# Patient Record
Sex: FEMALE | Race: WHITE | NOT HISPANIC OR LATINO | Employment: OTHER | RURAL
[De-identification: names, ages, dates, MRNs, and addresses within clinical notes are randomized per-mention and may not be internally consistent; named-entity substitution may affect disease eponyms.]

---

## 2017-04-12 ENCOUNTER — OFFICE VISIT (OUTPATIENT)
Dept: CARDIOLOGY | Facility: CLINIC | Age: 52
End: 2017-04-12

## 2017-04-12 VITALS
SYSTOLIC BLOOD PRESSURE: 116 MMHG | WEIGHT: 204 LBS | HEIGHT: 65 IN | HEART RATE: 94 BPM | DIASTOLIC BLOOD PRESSURE: 64 MMHG | BODY MASS INDEX: 33.99 KG/M2

## 2017-04-12 DIAGNOSIS — Z79.4 TYPE 2 DIABETES MELLITUS WITHOUT COMPLICATION, WITH LONG-TERM CURRENT USE OF INSULIN (HCC): ICD-10-CM

## 2017-04-12 DIAGNOSIS — E11.9 TYPE 2 DIABETES MELLITUS WITHOUT COMPLICATION, WITH LONG-TERM CURRENT USE OF INSULIN (HCC): ICD-10-CM

## 2017-04-12 DIAGNOSIS — R07.89 OTHER CHEST PAIN: ICD-10-CM

## 2017-04-12 DIAGNOSIS — G47.39 OTHER SLEEP APNEA: ICD-10-CM

## 2017-04-12 DIAGNOSIS — E78.49 OTHER HYPERLIPIDEMIA: Primary | ICD-10-CM

## 2017-04-12 DIAGNOSIS — I10 ESSENTIAL HYPERTENSION: ICD-10-CM

## 2017-04-12 DIAGNOSIS — R42 VERTIGO: ICD-10-CM

## 2017-04-12 PROBLEM — E78.5 HYPERLIPEMIA: Status: ACTIVE | Noted: 2017-04-12

## 2017-04-12 PROBLEM — Z86.711 PERSONAL HISTORY OF PE (PULMONARY EMBOLISM): Status: ACTIVE | Noted: 2017-04-12

## 2017-04-12 PROBLEM — G47.30 SLEEP APNEA: Status: ACTIVE | Noted: 2017-04-12

## 2017-04-12 PROCEDURE — 99213 OFFICE O/P EST LOW 20 MIN: CPT | Performed by: NURSE PRACTITIONER

## 2017-04-12 RX ORDER — MONTELUKAST SODIUM 10 MG/1
10 TABLET ORAL NIGHTLY
COMMUNITY

## 2017-04-12 RX ORDER — CYCLOBENZAPRINE HCL 5 MG
5 TABLET ORAL 3 TIMES DAILY PRN
COMMUNITY
End: 2021-06-07

## 2017-04-12 RX ORDER — FENOFIBRATE 160 MG/1
160 TABLET ORAL DAILY
COMMUNITY
End: 2021-06-07

## 2017-04-12 RX ORDER — ISOSORBIDE MONONITRATE 30 MG/1
30 TABLET, EXTENDED RELEASE ORAL DAILY
COMMUNITY

## 2017-04-12 RX ORDER — LACTULOSE 10 G/15ML
20 SOLUTION ORAL DAILY
COMMUNITY

## 2017-04-12 RX ORDER — METOCLOPRAMIDE 10 MG/1
10 TABLET ORAL 3 TIMES DAILY
COMMUNITY
End: 2018-06-06 | Stop reason: ALTCHOICE

## 2017-04-12 RX ORDER — MECLIZINE HYDROCHLORIDE 25 MG/1
25 TABLET ORAL EVERY 6 HOURS PRN
COMMUNITY

## 2017-04-12 RX ORDER — TEMAZEPAM 30 MG/1
30 CAPSULE ORAL DAILY
COMMUNITY

## 2017-04-12 RX ORDER — ALLOPURINOL 300 MG/1
300 TABLET ORAL DAILY
COMMUNITY

## 2017-04-12 RX ORDER — INSULIN GLARGINE 100 [IU]/ML
INJECTION, SOLUTION SUBCUTANEOUS DAILY
COMMUNITY
End: 2018-06-06 | Stop reason: ALTCHOICE

## 2017-04-12 RX ORDER — SUCRALFATE 1 G/1
1 TABLET ORAL 4 TIMES DAILY
COMMUNITY
End: 2018-06-06 | Stop reason: ALTCHOICE

## 2017-04-12 RX ORDER — METOPROLOL SUCCINATE 50 MG/1
50 TABLET, EXTENDED RELEASE ORAL DAILY
COMMUNITY
End: 2018-12-12 | Stop reason: DRUGHIGH

## 2017-04-12 RX ORDER — DULOXETIN HYDROCHLORIDE 60 MG/1
60 CAPSULE, DELAYED RELEASE ORAL DAILY
COMMUNITY

## 2017-04-12 RX ORDER — ATORVASTATIN CALCIUM 20 MG/1
20 TABLET, FILM COATED ORAL DAILY
COMMUNITY
End: 2021-06-07

## 2017-04-12 NOTE — PROGRESS NOTES
Chief Complaint   Patient presents with   • Follow-up     Having occasional hard sharp pain to left upper chest. PCP writes refills on medication. Last labs a month ago per PCP.   • Dizziness     She relates to vertigo.   • Shortness of Breath     About the same as before, wear CPAP at night and taking neb tx.       Cardiac Complaints  dyspnea and Dizziness      Subjective   Hedy Kelly is a 51 y.o. female with a history of DM, HTN,and past history of PE.  She was seen at [facility] in August of last year for chest pain with diaphoresis, troponin at that time was elevated.  She was then sent to [facility] where she had an echo that showed normal EF and only mild MR.  Cardiac cath was also done that showed essentially normal coronaries and normal LV function.  She returns today for follow up and states a rare chest pain in the left upper chest, it has been the same for sometime.  She does continue to have some shortness of breath but she relates to lung history and COPD.She continues eliquis therapy for history of PE.  Labs and refills she reports with PCP.  She does report some dizziness which she relates to allergies and vertigo.        Cardiac History  Past Surgical History:   Procedure Laterality Date   • CARDIAC CATHETERIZATION  08/24/2016    Normal Coronaries   • CARDIAC CATHETERIZATION  2004    at [place] after tPa. No stents.   • CARDIAC CATHETERIZATION  08/24/2016    EF 60%, normal coronaries   • CARDIOVASCULAR STRESS TEST  2014    ([facility] CVA)   • ECHO - CONVERTED  08/24/2016    @[facility]. EF 60%   • ECHO - CONVERTED  2014    ([facility])    • ECHO - CONVERTED  08/24/2016    EF 60%, mild MR       Current Outpatient Prescriptions   Medication Sig Dispense Refill   • allopurinol (ZYLOPRIM) 300 MG tablet Take 300 mg by mouth Daily.     • apixaban (ELIQUIS) 5 MG tablet tablet Take 5 mg by mouth 2 (Two) Times a Day.     • atorvastatin (LIPITOR) 20 MG tablet Take 20 mg by mouth Daily.     • cyclobenzaprine (FLEXERIL) 5 MG tablet  Take 5 mg by mouth 3 (Three) Times a Day As Needed for Muscle Spasms.     • DULoxetine (CYMBALTA) 60 MG capsule Take 60 mg by mouth Daily.     • esomeprazole (nexIUM) 20 MG capsule Take 20 mg by mouth 2 (Two) Times a Day.     • fenofibrate 160 MG tablet Take 160 mg by mouth Daily.     • insulin glargine (LANTUS) 100 UNIT/ML injection Inject  under the skin Daily.     • isosorbide mononitrate (IMDUR) 30 MG 24 hr tablet Take 30 mg by mouth Daily.     • lactulose (CHRONULAC) 10 GM/15ML solution Take 20 g by mouth Daily.     • meclizine (ANTIVERT) 25 MG tablet Take 25 mg by mouth Every 6 (Six) Hours As Needed for dizziness.     • metFORMIN (GLUCOPHAGE) 1000 MG tablet Take 1,000 mg by mouth 2 (Two) Times a Day With Meals.     • metoclopramide (REGLAN) 10 MG tablet Take 10 mg by mouth 3 (Three) Times a Day.     • metoprolol succinate XL (TOPROL-XL) 50 MG 24 hr tablet Take 50 mg by mouth Daily.     • montelukast (SINGULAIR) 10 MG tablet Take 10 mg by mouth Every Night.     • Psyllium (METAMUCIL FIBER PO) Take  by mouth Daily.     • SITagliptin (JANUVIA) 100 MG tablet Take 100 mg by mouth Daily.     • sucralfate (CARAFATE) 1 G tablet Take 1 g by mouth 4 (Four) Times a Day.     • temazepam (RESTORIL) 30 MG capsule Take 30 mg by mouth Daily.       No current facility-administered medications for this visit.        Penicillins and Sulfa antibiotics    Past Medical History:   Diagnosis Date   • H/O cervical spine surgery    • History of cholecystectomy    • History of endometrial ablation    • Hypercholesterolemia    • Hyperlipidemia    • Hypertension    • Metabolic syndrome    • Pulmonary emboli        Social History     Social History   • Marital status:      Spouse name: N/A   • Number of children: N/A   • Years of education: N/A     Occupational History   • Not on file.     Social History Main Topics   • Smoking status: Former Smoker     Quit date: 2004   • Smokeless tobacco: Never Used   • Alcohol use No   • Drug  "use: No   • Sexual activity: Not on file     Other Topics Concern   • Not on file     Social History Narrative       Family History   Problem Relation Age of Onset   • Hypertension Mother    • Cancer Father    • Heart attack Maternal Uncle    • Hyperlipidemia Son        Review of Systems   Constitutional: Negative.    HENT: Negative.    Eyes: Negative.    Respiratory: Positive for shortness of breath.    Cardiovascular: Negative.    Gastrointestinal: Negative.    Endocrine: Negative.    Musculoskeletal: Negative.    Neurological: Positive for dizziness.   Psychiatric/Behavioral: Negative.        DiabetesYes  Thyroidnormal    Objective     /64  Pulse 94  Ht 65\" (165.1 cm)  Wt 204 lb (92.5 kg)  BMI 33.95 kg/m2    Physical Exam   Constitutional: She is oriented to person, place, and time. She appears well-developed and well-nourished.   HENT:   Head: Normocephalic and atraumatic.   Eyes: EOM are normal. Pupils are equal, round, and reactive to light.   Neck: Normal range of motion. Neck supple.   Cardiovascular: Normal rate and regular rhythm.    Murmur heard.  Pulmonary/Chest: Effort normal and breath sounds normal.   Abdominal: Soft.   Musculoskeletal: Normal range of motion.   Neurological: She is alert and oriented to person, place, and time.   Skin: Skin is warm and dry.   Psychiatric: She has a normal mood and affect.       Procedures    Assessment/Plan     HR and BP are stable.  No changes to meds will be made.  Since no new cardiac concerns are present, we will continue with current management.  Most recent cath findings from August of 2016 showed normal coronaries and good LV function. No new cardiac testing advised today as cardiac status appears stable. Labs and refills with you, could we get next copy?  No refills are needed.  Good cardiac diet and activity as tolerated advised.  6 month follow up advised or sooner if needed.      Problems Addressed this Visit        Cardiovascular and Mediastinum "    HTN (hypertension)    Relevant Medications    metoprolol succinate XL (TOPROL-XL) 50 MG 24 hr tablet    Hyperlipemia - Primary    Relevant Medications    atorvastatin (LIPITOR) 20 MG tablet    fenofibrate 160 MG tablet       Endocrine    Type 2 diabetes mellitus without complication, without long-term current use of insulin    Relevant Medications    insulin glargine (LANTUS) 100 UNIT/ML injection    SITagliptin (JANUVIA) 100 MG tablet    metFORMIN (GLUCOPHAGE) 1000 MG tablet       Other    Sleep apnea    Vertigo      Other Visit Diagnoses     Other chest pain                  Electronically signed by AYLIN Martínez April 12, 2017 4:24 PM

## 2017-06-27 RX ORDER — DICYCLOMINE HCL 20 MG
TABLET ORAL
Qty: 90 TABLET | OUTPATIENT
Start: 2017-06-27

## 2017-11-21 ENCOUNTER — TELEPHONE (OUTPATIENT)
Dept: CARDIOLOGY | Facility: CLINIC | Age: 52
End: 2017-11-21

## 2017-11-21 NOTE — TELEPHONE ENCOUNTER
Received fax from [facility] for patient to have a colonoscopy and EGD on 12/18/17. Patient is on Eliquis.       Fax [phone]

## 2017-12-06 ENCOUNTER — OFFICE VISIT (OUTPATIENT)
Dept: CARDIOLOGY | Facility: CLINIC | Age: 52
End: 2017-12-06

## 2017-12-06 VITALS
WEIGHT: 209 LBS | DIASTOLIC BLOOD PRESSURE: 72 MMHG | HEIGHT: 65 IN | BODY MASS INDEX: 34.82 KG/M2 | HEART RATE: 88 BPM | SYSTOLIC BLOOD PRESSURE: 110 MMHG

## 2017-12-06 DIAGNOSIS — R07.89 OTHER CHEST PAIN: ICD-10-CM

## 2017-12-06 DIAGNOSIS — E11.9 TYPE 2 DIABETES MELLITUS WITHOUT COMPLICATION, WITH LONG-TERM CURRENT USE OF INSULIN (HCC): ICD-10-CM

## 2017-12-06 DIAGNOSIS — R00.2 PALPITATIONS: ICD-10-CM

## 2017-12-06 DIAGNOSIS — I10 ESSENTIAL HYPERTENSION: Primary | ICD-10-CM

## 2017-12-06 DIAGNOSIS — Z79.4 TYPE 2 DIABETES MELLITUS WITHOUT COMPLICATION, WITH LONG-TERM CURRENT USE OF INSULIN (HCC): ICD-10-CM

## 2017-12-06 DIAGNOSIS — G47.39 OTHER SLEEP APNEA: ICD-10-CM

## 2017-12-06 DIAGNOSIS — Z86.711 PERSONAL HISTORY OF PE (PULMONARY EMBOLISM): ICD-10-CM

## 2017-12-06 DIAGNOSIS — E78.49 OTHER HYPERLIPIDEMIA: ICD-10-CM

## 2017-12-06 DIAGNOSIS — R06.02 SHORTNESS OF BREATH: ICD-10-CM

## 2017-12-06 PROCEDURE — 99213 OFFICE O/P EST LOW 20 MIN: CPT | Performed by: NURSE PRACTITIONER

## 2017-12-06 NOTE — PROGRESS NOTES
Chief Complaint   Patient presents with   • Follow-up     For cardiac management. Reports that she does have chest pains that are sharp, says she did go to [facility] at one point. Says she was told that she had a small heart attack at [facility], but they weren't sure. Reports that she does get short of breath and can be sitting down when it happens. Reports that she does have palpitations occasionally, but that they normally happen when she wakes up if she does have them. Reports lab work done recently with PCP, not in chart. Reports that is showed anemia.   • Med Refill     Did not brign medication list. Instructed to call office with medication list.        Cardiac Complaints  chest pressure/discomfort, dyspnea and palpitations      Subjective   Hedy Kelly is a 52 y.o. female with  DM, HTN, COPD, and past history of PE.  She was seen at [facility] in August of last year for chest pain associated with diaphoresis, troponin at that time was elevated.  She was then sent to [facility] where she had an echo that showed normal EF and only mild MR. Cardiac cath was also done that showed essentially normal coronaries and normal LV function.  She continues eliquis therapy for history of PE.  She continues to have some rare chest pain that is sharp in nature.  She went to [facility] for the same, and they evaluated it and ruled out cardiac concerns.  She does continue to have shortness of breath that comes both with exertion and at rest, she relates to her COPD and continues to follow with Dr. Ramirez in regards.  She does admit to recent anemia on labs and states she had guiiac stool sample but unsure of results, she states she will have an EGD and colonoscopy on the 18th of the month with Dr. Bradford.  No refills are currently needed as she reports with PCP.         Cardiac History  Past Surgical History:   Procedure Laterality Date   • CARDIAC CATHETERIZATION  08/24/2016    Normal Coronaries   • CARDIAC CATHETERIZATION  2004    at [place] after tPa.  No stents.   • CARDIAC CATHETERIZATION  08/24/2016    EF 60%, normal coronaries   • CARDIOVASCULAR STRESS TEST  2014    ([facility] CVA)   • ECHO - CONVERTED  08/24/2016    @[facility]. EF 60%   • ECHO - CONVERTED  2014    ([facility]A)    • ECHO - CONVERTED  08/24/2016    EF 60%, mild MR       Current Outpatient Prescriptions   Medication Sig Dispense Refill   • allopurinol (ZYLOPRIM) 300 MG tablet Take 300 mg by mouth Daily.     • apixaban (ELIQUIS) 5 MG tablet tablet Take 1 tablet by mouth 2 (Two) Times a Day. 60 tablet 11   • atorvastatin (LIPITOR) 20 MG tablet Take 20 mg by mouth Daily.     • cyclobenzaprine (FLEXERIL) 5 MG tablet Take 5 mg by mouth 3 (Three) Times a Day As Needed for Muscle Spasms.     • DULoxetine (CYMBALTA) 60 MG capsule Take 60 mg by mouth Daily.     • esomeprazole (nexIUM) 20 MG capsule Take 20 mg by mouth 2 (Two) Times a Day.     • fenofibrate 160 MG tablet Take 160 mg by mouth Daily.     • insulin glargine (LANTUS) 100 UNIT/ML injection Inject  under the skin Daily.     • isosorbide mononitrate (IMDUR) 30 MG 24 hr tablet Take 30 mg by mouth Daily.     • lactulose (CHRONULAC) 10 GM/15ML solution Take 20 g by mouth Daily.     • meclizine (ANTIVERT) 25 MG tablet Take 25 mg by mouth Every 6 (Six) Hours As Needed for dizziness.     • metFORMIN (GLUCOPHAGE) 1000 MG tablet Take 1,000 mg by mouth 2 (Two) Times a Day With Meals.     • metoclopramide (REGLAN) 10 MG tablet Take 10 mg by mouth 3 (Three) Times a Day.     • metoprolol succinate XL (TOPROL-XL) 50 MG 24 hr tablet Take 50 mg by mouth Daily.     • montelukast (SINGULAIR) 10 MG tablet Take 10 mg by mouth Every Night.     • Psyllium (METAMUCIL FIBER PO) Take  by mouth Daily.     • SITagliptin (JANUVIA) 100 MG tablet Take 100 mg by mouth Daily.     • sucralfate (CARAFATE) 1 G tablet Take 1 g by mouth 4 (Four) Times a Day.     • temazepam (RESTORIL) 30 MG capsule Take 30 mg by mouth Daily.       No current facility-administered medications for this visit.   "      Oxycodone; Penicillins; and Sulfa antibiotics    Past Medical History:   Diagnosis Date   • H/O cervical spine surgery    • History of cholecystectomy    • History of endometrial ablation    • Hypercholesterolemia    • Hyperlipidemia    • Hypertension    • Metabolic syndrome    • Pulmonary emboli        Social History     Social History   • Marital status:      Spouse name: N/A   • Number of children: N/A   • Years of education: N/A     Occupational History   • Not on file.     Social History Main Topics   • Smoking status: Former Smoker     Quit date: 2004   • Smokeless tobacco: Never Used   • Alcohol use No   • Drug use: No   • Sexual activity: Not on file     Other Topics Concern   • Not on file     Social History Narrative       Family History   Problem Relation Age of Onset   • Hypertension Mother    • Cancer Father    • Heart attack Maternal Uncle    • Hyperlipidemia Son        Review of Systems   Constitution: Negative for weakness and malaise/fatigue.   Cardiovascular: Positive for chest pain and palpitations. Negative for dyspnea on exertion, near-syncope and syncope.   Respiratory: Positive for shortness of breath. Negative for wheezing.    Musculoskeletal: Negative for arthritis and back pain.   Gastrointestinal: Negative for anorexia, heartburn and nausea.   Genitourinary: Negative for dysuria, hesitancy and nocturia.   Neurological: Negative for dizziness, light-headedness, loss of balance and numbness.   Psychiatric/Behavioral: Negative for altered mental status and depression.       DiabetesYes  Thyroidnormal    Objective     /72 (BP Location: Right arm)  Pulse 88  Ht 165.1 cm (65\")  Wt 94.8 kg (209 lb)  BMI 34.78 kg/m2    Physical Exam   Constitutional: She is oriented to person, place, and time. She appears well-developed and well-nourished.   HENT:   Head: Normocephalic and atraumatic.   Eyes: EOM are normal. Pupils are equal, round, and reactive to light.   Neck: Normal " range of motion. Neck supple.   Cardiovascular: Normal rate and regular rhythm.    Murmur heard.  Pulmonary/Chest: Effort normal and breath sounds normal.   Abdominal: Soft.   Musculoskeletal: Normal range of motion.   Neurological: She is alert and oriented to person, place, and time.   Skin: Skin is warm and dry.   Psychiatric: She has a normal mood and affect. Her behavior is normal.       Procedures    Assessment/Plan     HR and BP are both stable today.  She does continue to have some rare sharp chest pains but this is no worse than prior, for now, we will continue on current regimen. No new cardiac workup will be advised. We talked about discontinuing her mobic therapy since she is on eliquis also.  Unsure if most recent labs, but she states she was anemic on them, she says she has remained on current. She follows with you in regards.  No current medication list available at present, patient reports she will get list to the office. She will follow up with GI later this month.  No refills are needed. She continues to follow with Dr. Ramirez in regards to pulmonary management. Good cardiac diet and activity as tolerated advised.  If cardiac symptoms should worsen, patient advised to call with concerns for further recommendations.  6 month follow up advised or sooner if needed.  At next visit, we will consider repeat cardiac workup since length of time since last testing and risk of silent ischemic burden.        Problems Addressed this Visit        Cardiovascular and Mediastinum    HTN (hypertension) - Primary    Hyperlipemia       Other    Sleep apnea    Personal history of PE (pulmonary embolism)      Other Visit Diagnoses     Other chest pain        Palpitations        Shortness of breath        Type 2 diabetes mellitus without complication, with long-term current use of insulin                      Electronically signed by AYLIN Martínez December 6, 2017 5:06 PM

## 2018-06-01 NOTE — PROGRESS NOTES
Chief Complaint   Patient presents with   • Follow-up     For cardiac management. Patient is on aspirin. Reports she has had chest pains that are sharp. Reports that she has had an increased HR occaisionally. Reports she does have shortness of breath all the time. Reports she does have palpitations. Last lab work was done about a month ago per PCP, not in chart.    • Med Refill     Needs refills on eliquis. 30 day supplys to [facility]. Also needs script for nitro.       Cardiac Complaints  palpitations      Subjective   Hedy Kelly is a 53 y.o. female with  DM, HTN, COPD, and past history of PE.  She was seen at [facility] in August of last year for chest pain associated with diaphoresis, troponin at that time was elevated.  She was then sent to [facility] where she had an echo that showed normal EF and only mild MR. Cardiac cath was also done that showed essentially normal coronaries and normal LV function.  She continues eliquis therapy for history of PE. She returns today for follow up and reports worsening palpitations.   She states that she feels like her heart is often racing and feels like it is beating too hard and too quick, she says it comes with and without exertion. She continues to have some rare chest pain that is sharp in nature. She does continue to have shortness of breath that comes both with exertion and at rest, she relates to her COPD and continues to follow with Dr. Ramirez in regards.  Labs are done with PCP, no copy is available for review.  Refills of eliquis and NTG requested for 30 day supply.      Cardiac History  Past Surgical History:   Procedure Laterality Date   • CARDIAC CATHETERIZATION  08/24/2016    Normal Coronaries   • CARDIAC CATHETERIZATION  2004    at [place] after tPa. No stents.   • CARDIAC CATHETERIZATION  08/24/2016    EF 60%, normal coronaries   • CARDIOVASCULAR STRESS TEST  2014    ([facility] CVA)   • ECHO - CONVERTED  08/24/2016    @[facility]. EF 60%   • ECHO - CONVERTED  2014     ([facility] CVA)    • ECHO - CONVERTED  08/24/2016    EF 60%, mild MR       Current Outpatient Prescriptions   Medication Sig Dispense Refill   • albuterol (PROVENTIL HFA;VENTOLIN HFA) 108 (90 Base) MCG/ACT inhaler Inhale 2 puffs Every 4 (Four) Hours As Needed for Wheezing.     • allopurinol (ZYLOPRIM) 300 MG tablet Take 300 mg by mouth Daily.     • apixaban (ELIQUIS) 5 MG tablet tablet Take 1 tablet by mouth Every 12 (Twelve) Hours. 60 tablet 11   • aspirin 81 MG EC tablet Take 81 mg by mouth Daily.     • atorvastatin (LIPITOR) 20 MG tablet Take 20 mg by mouth Daily.     • budesonide-formoterol (SYMBICORT) 160-4.5 MCG/ACT inhaler Inhale 2 puffs 2 (Two) Times a Day.     • cyclobenzaprine (FLEXERIL) 5 MG tablet Take 5 mg by mouth 3 (Three) Times a Day As Needed for Muscle Spasms.     • DULoxetine (CYMBALTA) 60 MG capsule Take 60 mg by mouth Daily.     • esomeprazole (nexIUM) 20 MG capsule Take 20 mg by mouth 2 (Two) Times a Day.     • Fe-Succ Ac-C-Thre Ac-B12-FA (FERREX 150 FORTE PLUS)  MG capsule capsule Take  by mouth Daily With Breakfast.     • fenofibrate 160 MG tablet Take 160 mg by mouth Daily.     • folic acid (FOLVITE) 400 MCG tablet Take 400 mcg by mouth Daily.     • furosemide (LASIX) 40 MG tablet Take 40 mg by mouth 2 (Two) Times a Day.     • Insulin Glargine (BASAGLAR KWIKPEN) 100 UNIT/ML injection pen Inject  under the skin.     • isosorbide mononitrate (IMDUR) 30 MG 24 hr tablet Take 30 mg by mouth Daily.     • lactulose (CHRONULAC) 10 GM/15ML solution Take 20 g by mouth Daily.     • levocetirizine (XYZAL) 5 MG tablet Take 5 mg by mouth Every Evening.     • meclizine (ANTIVERT) 25 MG tablet Take 25 mg by mouth Every 6 (Six) Hours As Needed for dizziness.     • meloxicam (MOBIC) 15 MG tablet Take 15 mg by mouth Daily.     • metFORMIN (GLUCOPHAGE) 1000 MG tablet Take 1,000 mg by mouth 2 (Two) Times a Day With Meals.     • metoprolol succinate XL (TOPROL-XL) 50 MG 24 hr tablet Take 50 mg by mouth Daily.      • montelukast (SINGULAIR) 10 MG tablet Take 10 mg by mouth Every Night.     • potassium chloride (K-DUR,KLOR-CON) 20 MEQ CR tablet Take 20 mEq by mouth Daily.     • Psyllium (METAMUCIL FIBER PO) Take  by mouth Daily.     • SITagliptin (JANUVIA) 100 MG tablet Take 100 mg by mouth Daily.     • temazepam (RESTORIL) 30 MG capsule Take 30 mg by mouth Daily.     • Tiotropium Bromide Monohydrate (SPIRIVA RESPIMAT IN) Inhale.     • vitamin B-12 (CYANOCOBALAMIN) 500 MCG tablet Take 500 mcg by mouth 2 (Two) Times a Day.     • vitamin C (ASCORBIC ACID) 500 MG tablet Take 500 mg by mouth Daily.     • nitroglycerin (NITROSTAT) 0.4 MG SL tablet 1 under the tongue as needed for angina, may repeat q5mins for up three doses 25 tablet 0     No current facility-administered medications for this visit.        Oxycodone; Penicillins; and Sulfa antibiotics    Past Medical History:   Diagnosis Date   • H/O cervical spine surgery    • History of cholecystectomy    • History of endometrial ablation    • Hypercholesterolemia    • Hyperlipidemia    • Hypertension    • Metabolic syndrome    • Pulmonary emboli        Social History     Social History   • Marital status:      Spouse name: N/A   • Number of children: N/A   • Years of education: N/A     Occupational History   • Not on file.     Social History Main Topics   • Smoking status: Former Smoker     Quit date: 2004   • Smokeless tobacco: Never Used   • Alcohol use No   • Drug use: No   • Sexual activity: Not on file     Other Topics Concern   • Not on file     Social History Narrative   • No narrative on file       Family History   Problem Relation Age of Onset   • Hypertension Mother    • Cancer Father    • Heart attack Maternal Uncle    • Hyperlipidemia Son        Review of Systems   Constitution: Negative for weakness and malaise/fatigue.   Cardiovascular: Positive for chest pain, dyspnea on exertion and palpitations. Negative for irregular heartbeat, near-syncope, paroxysmal  "nocturnal dyspnea and syncope.   Respiratory: Positive for shortness of breath. Negative for cough and wheezing.    Musculoskeletal: Negative for back pain, joint pain and joint swelling.   Gastrointestinal: Negative for anorexia, heartburn and nausea.   Genitourinary: Negative for dysuria, hematuria, hesitancy and nocturia.   Neurological: Negative for dizziness, light-headedness and loss of balance.   Psychiatric/Behavioral: Negative for depression and memory loss. The patient is not nervous/anxious.            Objective     /80 (BP Location: Right arm)   Pulse 84   Ht 165.1 cm (65\")   Wt 89.4 kg (197 lb)   BMI 32.78 kg/m²     Physical Exam   Constitutional: She is oriented to person, place, and time. She appears well-developed and well-nourished.   HENT:   Head: Normocephalic and atraumatic.   Eyes: EOM are normal. Pupils are equal, round, and reactive to light.   Neck: Normal range of motion. Neck supple.   Cardiovascular: Normal rate and regular rhythm.    Murmur heard.  Pulmonary/Chest: Effort normal and breath sounds normal.   Abdominal: Soft.   Musculoskeletal: Normal range of motion.   Neurological: She is alert and oriented to person, place, and time.   Skin: Skin is warm and dry.   Psychiatric: She has a normal mood and affect. Her behavior is normal.       Procedures    Assessment/Plan     HR and BP are stable today.  HTN well managed on current, no adjustment advised.  For hyperlipidemia management, she continues on lipitor therapy without concerns and tolerates well. FLP she reports with your office.  She does report more palpitations recently and feels like her heart is often racing, we will advise to wear a 5 day holter monitor to assess for any tachyarrhythmias.  More recommendations to follow.  Limited caffeine intake encouraged.  For DM she continues on glucophage, januvia, and insulin therapy and tolerates without concerns, AIC monitored with your office.  For COPD management, patient " continues to follow with Dr. Ramirez in regards and continues with CPAP therapy at night for sleep apnea but denies use of oxygen bled in.  Refills of eliquis therapy sent per request as bleeding is denied. BMI elevated at 32.78, good cardiac ADA diet with limited caloric intake, saturated fat, and carbohydrates encouraged.  6 month follow up advised or sooner if needed.       Problems Addressed this Visit        Cardiovascular and Mediastinum    HTN (hypertension)    Relevant Medications    furosemide (LASIX) 40 MG tablet    Hyperlipemia       Endocrine    Type 2 diabetes mellitus without complication, without long-term current use of insulin    Relevant Medications    Insulin Glargine (BASAGLAR KWIKPEN) 100 UNIT/ML injection pen       Other    Personal history of PE (pulmonary embolism)      Other Visit Diagnoses     Palpitations    -  Primary    Relevant Orders    Holter Monitor - 72 Hour Up To 21 Days    Tachycardia        Relevant Orders    Holter Monitor - 72 Hour Up To 21 Days    Obstructive sleep apnea syndrome        COPD mixed type        Relevant Medications    albuterol (PROVENTIL HFA;VENTOLIN HFA) 108 (90 Base) MCG/ACT inhaler    budesonide-formoterol (SYMBICORT) 160-4.5 MCG/ACT inhaler    Tiotropium Bromide Monohydrate (SPIRIVA RESPIMAT IN)    levocetirizine (XYZAL) 5 MG tablet    Obesity (BMI 30.0-34.9)              Patient's Body mass index is 32.78 kg/m². BMI is above normal parameters. Recommendations include: nutrition counseling.          Electronically signed by AYLIN Martínez June 6, 2018 3:44 PM

## 2018-06-06 ENCOUNTER — TELEPHONE (OUTPATIENT)
Dept: CARDIOLOGY | Facility: CLINIC | Age: 53
End: 2018-06-06

## 2018-06-06 ENCOUNTER — OFFICE VISIT (OUTPATIENT)
Dept: CARDIOLOGY | Facility: CLINIC | Age: 53
End: 2018-06-06

## 2018-06-06 VITALS
HEIGHT: 65 IN | HEART RATE: 84 BPM | BODY MASS INDEX: 32.82 KG/M2 | WEIGHT: 197 LBS | SYSTOLIC BLOOD PRESSURE: 120 MMHG | DIASTOLIC BLOOD PRESSURE: 80 MMHG

## 2018-06-06 DIAGNOSIS — G47.33 OBSTRUCTIVE SLEEP APNEA SYNDROME: ICD-10-CM

## 2018-06-06 DIAGNOSIS — I10 ESSENTIAL HYPERTENSION: ICD-10-CM

## 2018-06-06 DIAGNOSIS — J44.9 COPD MIXED TYPE (HCC): ICD-10-CM

## 2018-06-06 DIAGNOSIS — Z86.711 PERSONAL HISTORY OF PE (PULMONARY EMBOLISM): ICD-10-CM

## 2018-06-06 DIAGNOSIS — E11.9 TYPE 2 DIABETES MELLITUS WITHOUT COMPLICATION, WITHOUT LONG-TERM CURRENT USE OF INSULIN (HCC): ICD-10-CM

## 2018-06-06 DIAGNOSIS — R00.0 TACHYCARDIA: ICD-10-CM

## 2018-06-06 DIAGNOSIS — E78.00 PURE HYPERCHOLESTEROLEMIA: ICD-10-CM

## 2018-06-06 DIAGNOSIS — R00.2 PALPITATIONS: Primary | ICD-10-CM

## 2018-06-06 DIAGNOSIS — E66.9 OBESITY (BMI 30.0-34.9): ICD-10-CM

## 2018-06-06 PROCEDURE — 99214 OFFICE O/P EST MOD 30 MIN: CPT | Performed by: NURSE PRACTITIONER

## 2018-06-06 RX ORDER — IRON ASPGLY,PS/C/B12/FA/CA/SUC 150-25-1
CAPSULE ORAL
COMMUNITY
End: 2021-06-07

## 2018-06-06 RX ORDER — BUDESONIDE AND FORMOTEROL FUMARATE DIHYDRATE 160; 4.5 UG/1; UG/1
2 AEROSOL RESPIRATORY (INHALATION)
COMMUNITY
End: 2021-06-07

## 2018-06-06 RX ORDER — ALBUTEROL SULFATE 90 UG/1
2 AEROSOL, METERED RESPIRATORY (INHALATION) EVERY 4 HOURS PRN
COMMUNITY

## 2018-06-06 RX ORDER — LEVOCETIRIZINE DIHYDROCHLORIDE 5 MG/1
5 TABLET, FILM COATED ORAL EVERY EVENING
COMMUNITY

## 2018-06-06 RX ORDER — ASPIRIN 81 MG/1
81 TABLET ORAL DAILY
COMMUNITY
End: 2021-06-07

## 2018-06-06 RX ORDER — NITROGLYCERIN 0.4 MG/1
TABLET SUBLINGUAL
Qty: 25 TABLET | Refills: 0 | Status: SHIPPED | OUTPATIENT
Start: 2018-06-06 | End: 2019-11-27 | Stop reason: SDUPTHER

## 2018-06-06 RX ORDER — POTASSIUM CHLORIDE 20 MEQ/1
20 TABLET, EXTENDED RELEASE ORAL DAILY
COMMUNITY

## 2018-06-06 RX ORDER — INSULIN GLARGINE 100 [IU]/ML
INJECTION, SOLUTION SUBCUTANEOUS
COMMUNITY

## 2018-06-06 RX ORDER — FUROSEMIDE 40 MG/1
40 TABLET ORAL DAILY
COMMUNITY

## 2018-06-06 RX ORDER — MELOXICAM 15 MG/1
15 TABLET ORAL DAILY
COMMUNITY
End: 2019-11-27 | Stop reason: ALTCHOICE

## 2018-06-06 RX ORDER — ASCORBIC ACID 500 MG
500 TABLET ORAL DAILY
COMMUNITY
End: 2021-06-07

## 2018-06-06 RX ORDER — CHOLECALCIFEROL (VITAMIN D3) 125 MCG
500 CAPSULE ORAL 2 TIMES DAILY
COMMUNITY
End: 2021-06-07

## 2018-06-06 RX ORDER — LANOLIN ALCOHOL/MO/W.PET/CERES
400 CREAM (GRAM) TOPICAL DAILY
COMMUNITY
End: 2021-06-07

## 2018-06-12 ENCOUNTER — TELEPHONE (OUTPATIENT)
Dept: CARDIOLOGY | Facility: CLINIC | Age: 53
End: 2018-06-12

## 2018-06-12 NOTE — TELEPHONE ENCOUNTER
As staff will not be at [facility] this Wednesday, patient was called to ask if she could come to [facility] for e-patch placement. She asked if she could come to [facility] next Wednesday instead.

## 2018-07-02 ENCOUNTER — OUTSIDE FACILITY SERVICE (OUTPATIENT)
Dept: CARDIOLOGY | Facility: CLINIC | Age: 53
End: 2018-07-02

## 2018-07-02 PROCEDURE — 0298T PR EXT ECG > 48HR TO 21 DAY REVIEW AND INTERPRETATN: CPT | Performed by: INTERNAL MEDICINE

## 2018-12-07 NOTE — PROGRESS NOTES
Chief Complaint   Patient presents with   • Follow-up     For cardiac management. Patient is on aspirin. Reports that she has been having some sharp chest pains. Reports that she has had shortness of breath. Reports she has had a couple of episodes where it felt like the pain was so bad she couldn't. Reports that she has had some palpitations. Dr. Cardoza does lab work every 3 months, not in chart.    • Med Refill     Did not bring medication list.        Cardiac Complaints  chest pressure/discomfort and dyspnea, palpitations      Subjective   Hedy Kelly is a 53 y.o. female with  DM, HTN, COPD, and past history of PE.  She was seen at [facility] in August of 2016 for chest pain associated with diaphoresis. Troponin at that time was elevated.  She was then sent to [facility] where she had an echo that showed normal EF and only mild MR. Cardiac cath was also done that showed essentially normal coronaries and normal LV function.  She continues eliquis therapy for history of PE.  At last visit, palpitations were reported, holter was advised which showed baseline NSR, no arrhythmia.  Current was continued.  She returns today for follow up and reports several episodes over the last several months of severe chest pain. She says the pain will come at once and is sharp in nature in the center of her chest without radiation and is about 10/10.  Goes away after a few minutes, she did take NTG x1 with the first spell, but says other 2 went away on her own.  She does also have some shortness of breath and palpitations but admits to being sick over the last several weeks and has been battling bronchitis which she has discussed with PCP.  She does report seeing Dr. Cardoza for anemia and states she was told she had a fast heart rate at that time.  Labs done with PCP and every 3 months with Dr. Cardoza.  No refills needed, no list available for review.  Patient reports blood sugars have been elevated but not over 200.  She continues to follow  with Dr. Ramirez for pulmonary management.      Cardiac History  Past Surgical History:   Procedure Laterality Date   • CARDIAC CATHETERIZATION  08/24/2016    Normal Coronaries   • CARDIAC CATHETERIZATION  2004    at [place] after tPa. No stents.   • CARDIAC CATHETERIZATION  08/24/2016    EF 60%, normal coronaries   • CARDIOVASCULAR STRESS TEST  2014    ([facility] CVA)   • CONVERTED (HISTORICAL) HOLTER  07/02/2018    Baseline NSR:  Avg HR 92, no arrythmia, max 133, low 63   • ECHO - CONVERTED  08/24/2016    @[facility]. EF 60%   • ECHO - CONVERTED  2014    ([facility] CVA)    • ECHO - CONVERTED  08/24/2016    EF 60%, mild MR       Current Outpatient Medications   Medication Sig Dispense Refill   • albuterol (PROVENTIL HFA;VENTOLIN HFA) 108 (90 Base) MCG/ACT inhaler Inhale 2 puffs Every 4 (Four) Hours As Needed for Wheezing.     • allopurinol (ZYLOPRIM) 300 MG tablet Take 300 mg by mouth Daily.     • apixaban (ELIQUIS) 5 MG tablet tablet Take 1 tablet by mouth Every 12 (Twelve) Hours. 60 tablet 11   • aspirin 81 MG EC tablet Take 81 mg by mouth Daily.     • atorvastatin (LIPITOR) 20 MG tablet Take 20 mg by mouth Daily.     • benzonatate (TESSALON PERLES) 100 MG capsule Take 1 capsule by mouth 3 (Three) Times a Day As Needed for Cough. 30 capsule 0   • budesonide-formoterol (SYMBICORT) 160-4.5 MCG/ACT inhaler Inhale 2 puffs 2 (Two) Times a Day.     • cyclobenzaprine (FLEXERIL) 5 MG tablet Take 5 mg by mouth 3 (Three) Times a Day As Needed for Muscle Spasms.     • DULoxetine (CYMBALTA) 60 MG capsule Take 60 mg by mouth Daily.     • esomeprazole (nexIUM) 20 MG capsule Take 20 mg by mouth 2 (Two) Times a Day.     • Fe-Succ Ac-C-Thre Ac-B12-FA (FERREX 150 FORTE PLUS)  MG capsule capsule Take  by mouth Daily With Breakfast.     • fenofibrate 160 MG tablet Take 160 mg by mouth Daily.     • folic acid (FOLVITE) 400 MCG tablet Take 400 mcg by mouth Daily.     • furosemide (LASIX) 40 MG tablet Take 40 mg by mouth 2 (Two) Times a Day.      • Insulin Glargine (BASAGLAR KWIKPEN) 100 UNIT/ML injection pen Inject  under the skin.     • isosorbide mononitrate (IMDUR) 30 MG 24 hr tablet Take 30 mg by mouth Daily.     • lactulose (CHRONULAC) 10 GM/15ML solution Take 20 g by mouth Daily.     • levocetirizine (XYZAL) 5 MG tablet Take 5 mg by mouth Every Evening.     • meclizine (ANTIVERT) 25 MG tablet Take 25 mg by mouth Every 6 (Six) Hours As Needed for dizziness.     • meloxicam (MOBIC) 15 MG tablet Take 15 mg by mouth Daily.     • metFORMIN (GLUCOPHAGE) 1000 MG tablet Take 1,000 mg by mouth 2 (Two) Times a Day With Meals.     • metoprolol succinate XL (TOPROL-XL) 50 MG 24 hr tablet Take 1 tablet by mouth 2 (Two) Times a Day. 60 tablet 8   • montelukast (SINGULAIR) 10 MG tablet Take 10 mg by mouth Every Night.     • nitroglycerin (NITROSTAT) 0.4 MG SL tablet 1 under the tongue as needed for angina, may repeat q5mins for up three doses 25 tablet 0   • potassium chloride (K-DUR,KLOR-CON) 20 MEQ CR tablet Take 20 mEq by mouth Daily.     • Psyllium (METAMUCIL FIBER PO) Take  by mouth Daily.     • SITagliptin (JANUVIA) 100 MG tablet Take 100 mg by mouth Daily.     • temazepam (RESTORIL) 30 MG capsule Take 30 mg by mouth Daily.     • Tiotropium Bromide Monohydrate (SPIRIVA RESPIMAT IN) Inhale.     • vitamin B-12 (CYANOCOBALAMIN) 500 MCG tablet Take 500 mcg by mouth 2 (Two) Times a Day.     • vitamin C (ASCORBIC ACID) 500 MG tablet Take 500 mg by mouth Daily.       No current facility-administered medications for this visit.        Oxycodone; Penicillins; and Sulfa antibiotics    Past Medical History:   Diagnosis Date   • H/O cervical spine surgery    • History of cholecystectomy    • History of endometrial ablation    • Hypercholesterolemia    • Hyperlipidemia    • Hypertension    • Metabolic syndrome    • Pulmonary emboli (CMS/HCC)        Social History     Socioeconomic History   • Marital status:      Spouse name: Not on file   • Number of children:  "Not on file   • Years of education: Not on file   • Highest education level: Not on file   Social Needs   • Financial resource strain: Not on file   • Food insecurity - worry: Not on file   • Food insecurity - inability: Not on file   • Transportation needs - medical: Not on file   • Transportation needs - non-medical: Not on file   Occupational History   • Not on file   Tobacco Use   • Smoking status: Former Smoker     Last attempt to quit: 2004     Years since quittin.9   • Smokeless tobacco: Never Used   Substance and Sexual Activity   • Alcohol use: No   • Drug use: No   • Sexual activity: Not on file   Other Topics Concern   • Not on file   Social History Narrative   • Not on file       Family History   Problem Relation Age of Onset   • Hypertension Mother    • Cancer Father    • Heart attack Maternal Uncle    • Hyperlipidemia Son        Review of Systems   Constitution: Positive for weight loss. Negative for weakness and malaise/fatigue.   Cardiovascular: Positive for chest pain, dyspnea on exertion and palpitations. Negative for claudication, irregular heartbeat, leg swelling, near-syncope and syncope.   Respiratory: Positive for cough, shortness of breath and wheezing.    Musculoskeletal: Negative for back pain, joint pain and joint swelling.   Gastrointestinal: Negative for anorexia, heartburn, nausea and vomiting.   Genitourinary: Negative for dysuria, hematuria, hesitancy and nocturia.   Neurological: Negative for dizziness, light-headedness and loss of balance.   Psychiatric/Behavioral: Negative for depression and memory loss. The patient is not nervous/anxious.            Objective     /90 (BP Location: Left arm)   Pulse 80   Ht 165.1 cm (65\")   Wt 82.1 kg (181 lb)   BMI 30.12 kg/m²     Physical Exam   Constitutional: She is oriented to person, place, and time. She appears well-developed and well-nourished.   HENT:   Head: Normocephalic and atraumatic.   Eyes: EOM are normal. Pupils are " equal, round, and reactive to light.   Neck: Normal range of motion. Neck supple.   Cardiovascular: Normal rate and regular rhythm.   Murmur heard.  Pulmonary/Chest: Effort normal and breath sounds normal.   Abdominal: Soft.   Musculoskeletal: Normal range of motion.   Neurological: She is alert and oriented to person, place, and time.   Skin: Skin is warm and dry.   Psychiatric: She has a normal mood and affect. Her behavior is normal.       Procedures    Assessment/Plan     HR is stable today but patient admits that she has been having some tachycardia and palpitations.  Metoprolol dosing will be increased to 50mg XL BID.  Blood pressure is elevated also at 140/90, increase of toprol should provide better HTN management.  Heart rate and blood pressure log encouraged. Repeat cardiac workup with stress and echo recommended as chest pain and shortness of breath reported.  Stress will be ordered as lexiscan as patient does not feel she can walk secondary to joint pain and asthma.  She does have shortness of breath which she relates to asthma and bronchitis.  She is also coughing a great deal and tessalon pearles ordered for management.  Patient encouraged to follow with you in regards for bronchitis management.  DM is followed by your office as well including AIC management.  Hyperlipidemia she admits is managed with statin therapy and she follows with your office for FLP management.  BMI elevated at 30.12, good cardiac ADA diet and activity as tolerated advised but about 17 pound weight loss noted as she reports not being as hungry as prior.  6 month follow up recommended or sooner if needed.        Problems Addressed this Visit        Cardiovascular and Mediastinum    HTN (hypertension)    Relevant Medications    metoprolol succinate XL (TOPROL-XL) 50 MG 24 hr tablet    Hyperlipemia       Respiratory    Sleep apnea       Endocrine    Type 2 diabetes mellitus without complication, without long-term current use of  insulin (CMS/[facility])      Other Visit Diagnoses     Other chest pain    -  Primary    Relevant Orders    Stress Test With Myocardial Perfusion One Day    Adult Transthoracic Echo Complete W/ Cont if Necessary Per Protocol    Anginal equivalent (CMS/[facility])        Relevant Medications    metoprolol succinate XL (TOPROL-XL) 50 MG 24 hr tablet    Other Relevant Orders    Stress Test With Myocardial Perfusion One Day    Adult Transthoracic Echo Complete W/ Cont if Necessary Per Protocol    Shortness of breath        Relevant Orders    Stress Test With Myocardial Perfusion One Day    Adult Transthoracic Echo Complete W/ Cont if Necessary Per Protocol    Palpitations              Patient's Body mass index is 30.12 kg/m². BMI is above normal parameters. Recommendations include: nutrition counseling.        Electronically signed by AYLIN Martínez December 12, 2018 3:55 PM

## 2018-12-12 ENCOUNTER — OFFICE VISIT (OUTPATIENT)
Dept: CARDIOLOGY | Facility: CLINIC | Age: 53
End: 2018-12-12

## 2018-12-12 VITALS
HEART RATE: 80 BPM | BODY MASS INDEX: 30.16 KG/M2 | HEIGHT: 65 IN | DIASTOLIC BLOOD PRESSURE: 90 MMHG | SYSTOLIC BLOOD PRESSURE: 140 MMHG | WEIGHT: 181 LBS

## 2018-12-12 DIAGNOSIS — I10 ESSENTIAL HYPERTENSION: ICD-10-CM

## 2018-12-12 DIAGNOSIS — R07.89 OTHER CHEST PAIN: Primary | ICD-10-CM

## 2018-12-12 DIAGNOSIS — G47.33 OBSTRUCTIVE SLEEP APNEA SYNDROME: ICD-10-CM

## 2018-12-12 DIAGNOSIS — I20.8 ANGINAL EQUIVALENT (HCC): ICD-10-CM

## 2018-12-12 DIAGNOSIS — R06.02 SHORTNESS OF BREATH: ICD-10-CM

## 2018-12-12 DIAGNOSIS — R00.2 PALPITATIONS: ICD-10-CM

## 2018-12-12 DIAGNOSIS — E11.9 TYPE 2 DIABETES MELLITUS WITHOUT COMPLICATION, WITHOUT LONG-TERM CURRENT USE OF INSULIN (HCC): ICD-10-CM

## 2018-12-12 DIAGNOSIS — E78.00 PURE HYPERCHOLESTEROLEMIA: ICD-10-CM

## 2018-12-12 PROCEDURE — 99214 OFFICE O/P EST MOD 30 MIN: CPT | Performed by: NURSE PRACTITIONER

## 2018-12-12 RX ORDER — BENZONATATE 100 MG/1
100 CAPSULE ORAL 3 TIMES DAILY PRN
Qty: 30 CAPSULE | Refills: 0 | Status: SHIPPED | OUTPATIENT
Start: 2018-12-12 | End: 2021-06-07

## 2018-12-12 RX ORDER — METOPROLOL SUCCINATE 50 MG/1
50 TABLET, EXTENDED RELEASE ORAL 2 TIMES DAILY
Qty: 60 TABLET | Refills: 8 | Status: SHIPPED | OUTPATIENT
Start: 2018-12-12 | End: 2019-08-24 | Stop reason: SDUPTHER

## 2019-01-14 PROCEDURE — 78452 HT MUSCLE IMAGE SPECT MULT: CPT | Performed by: INTERNAL MEDICINE

## 2019-01-14 PROCEDURE — 93306 TTE W/DOPPLER COMPLETE: CPT | Performed by: INTERNAL MEDICINE

## 2019-01-14 PROCEDURE — 93018 CV STRESS TEST I&R ONLY: CPT | Performed by: INTERNAL MEDICINE

## 2019-01-16 ENCOUNTER — OUTSIDE FACILITY SERVICE (OUTPATIENT)
Dept: CARDIOLOGY | Facility: CLINIC | Age: 54
End: 2019-01-16

## 2019-01-23 ENCOUNTER — OUTSIDE FACILITY SERVICE (OUTPATIENT)
Dept: CARDIOLOGY | Facility: CLINIC | Age: 54
End: 2019-01-23

## 2019-06-11 RX ORDER — APIXABAN 5 MG/1
TABLET, FILM COATED ORAL
Qty: 60 TABLET | Refills: 11 | Status: SHIPPED | OUTPATIENT
Start: 2019-06-11 | End: 2020-06-16

## 2019-08-26 RX ORDER — METOPROLOL SUCCINATE 100 MG/1
TABLET, EXTENDED RELEASE ORAL
Qty: 30 TABLET | Refills: 0 | Status: SHIPPED | OUTPATIENT
Start: 2019-08-26 | End: 2019-10-22 | Stop reason: SDUPTHER

## 2019-10-22 RX ORDER — METOPROLOL SUCCINATE 100 MG/1
TABLET, EXTENDED RELEASE ORAL
Qty: 30 TABLET | Refills: 0 | OUTPATIENT
Start: 2019-10-22

## 2019-10-22 RX ORDER — METOPROLOL SUCCINATE 100 MG/1
TABLET, EXTENDED RELEASE ORAL
Qty: 30 TABLET | Refills: 0 | Status: SHIPPED | OUTPATIENT
Start: 2019-10-22 | End: 2019-11-27 | Stop reason: SDUPTHER

## 2019-11-27 ENCOUNTER — OFFICE VISIT (OUTPATIENT)
Dept: CARDIOLOGY | Facility: CLINIC | Age: 54
End: 2019-11-27

## 2019-11-27 VITALS
BODY MASS INDEX: 32.82 KG/M2 | WEIGHT: 197 LBS | HEART RATE: 68 BPM | SYSTOLIC BLOOD PRESSURE: 122 MMHG | HEIGHT: 65 IN | DIASTOLIC BLOOD PRESSURE: 82 MMHG

## 2019-11-27 DIAGNOSIS — R06.02 SHORTNESS OF BREATH: ICD-10-CM

## 2019-11-27 DIAGNOSIS — I10 ESSENTIAL HYPERTENSION: Primary | ICD-10-CM

## 2019-11-27 DIAGNOSIS — G93.32 CHRONIC FATIGUE DISORDER: ICD-10-CM

## 2019-11-27 DIAGNOSIS — J44.9 COPD MIXED TYPE (HCC): ICD-10-CM

## 2019-11-27 DIAGNOSIS — E78.00 PURE HYPERCHOLESTEROLEMIA: ICD-10-CM

## 2019-11-27 DIAGNOSIS — E11.9 TYPE 2 DIABETES MELLITUS WITHOUT COMPLICATION, WITHOUT LONG-TERM CURRENT USE OF INSULIN (HCC): ICD-10-CM

## 2019-11-27 DIAGNOSIS — I20.8 CHRONIC STABLE ANGINA (HCC): ICD-10-CM

## 2019-11-27 DIAGNOSIS — E66.9 OBESITY (BMI 30.0-34.9): ICD-10-CM

## 2019-11-27 DIAGNOSIS — R00.2 PALPITATIONS: ICD-10-CM

## 2019-11-27 DIAGNOSIS — D64.9 CHRONIC ANEMIA: ICD-10-CM

## 2019-11-27 PROCEDURE — 99213 OFFICE O/P EST LOW 20 MIN: CPT | Performed by: NURSE PRACTITIONER

## 2019-11-27 RX ORDER — NITROGLYCERIN 0.4 MG/1
TABLET SUBLINGUAL
Qty: 25 TABLET | Refills: 0 | Status: SHIPPED | OUTPATIENT
Start: 2019-11-27 | End: 2021-06-07 | Stop reason: SDUPTHER

## 2019-11-27 RX ORDER — METOPROLOL SUCCINATE 100 MG/1
50 TABLET, EXTENDED RELEASE ORAL 2 TIMES DAILY
Qty: 90 TABLET | Refills: 2 | Status: SHIPPED | OUTPATIENT
Start: 2019-11-27 | End: 2020-08-26

## 2019-11-27 NOTE — PROGRESS NOTES
Chief Complaint   Patient presents with   • Follow-up     For cardiac management. Patient is on aspirin. Reports that Dr. Cardoza did blood work a couple of weeks ago. Reports that platelets and hemoglobin are low. Reports that she feels tired all the time. Reports that Dr. Onur Mcclellan thinks that she is not mkaing enough blood. Reports    • Med Refill     Needs a refill on nitro to [facility]. Hers has . PCP does medication refills. Went over medications.   • Chest Pain     Reports that she has had chest pain.   • Shortness of Breath     Reports that she does have shortness of breath, states that it doesn't long per her to get SOA.    • Palpitations     Reports that she does have palpitations.        Cardiac Complaints  dyspnea, fatigue and palpitations      Subjective   Hedy Kelly is a 54 y.o. female with  DM, HTN, COPD,anemia, and past history of PE.  She was seen at [facility] in 2016 for chest pain associated with diaphoresis. Troponin at that time was elevated.  She was then sent to [facility] where she had an echo that showed normal EF and only mild MR. Cardiac cath was also done that showed essentially normal coronaries and normal LV function.  She continues eliquis therapy for history of PE. At last visit, palpitations were reported, holter was advised which showed baseline NSR, no arrhythmia.  Current was continued.  At last visit, she returned with more episodes of chest pain with use of SL NTG. Cardiac workup in 2019 with stress and echo were recommended. Stress showed no ischemia, good LV function, and echo showed pulmonary HTN.    She returns today for follow up and continues to be weak and fatigued. Patient states H/H remains low and she is following with Dr. Cardoza in regards.  She has not had to have transfusion in regards.  She does take iron daily. She also follows with GI and states Dr. Mcclellan told her that she is not making enough RBC.  She does also have shortness of breath but  admits it has improved.  She remains followed by pulmonary for management for COPD and will see again in January. Chest pain continues on occasion and at random, she describes as sharp.  She is requesting NTG to be sent to be used as needed.  Eliquis therapy continues for history of PE.  Tolerance is reported with bleed and bruising denied.  Palpitations occur but not often.  They last a few seconds and then disappear.  Labs and refills she reports with PCP.  No current available. She does continue to be followed for AIC management/DM but she is unsure of what most recent showed.        Cardiac History  Past Surgical History:   Procedure Laterality Date   • CARDIAC CATHETERIZATION  08/24/2016    Normal Coronaries   • CARDIAC CATHETERIZATION  2004    at [facility] after tPa. No stents.   • CARDIAC CATHETERIZATION  08/24/2016    EF 60%, normal coronaries   • CARDIOVASCULAR STRESS TEST  2014    ([facility])   • CARDIOVASCULAR STRESS TEST  01/23/2019    @ [facility]. L. Cardiolite- Negative. (Had nausea,vomitting and SOA.)   • CONVERTED (HISTORICAL) HOLTER  07/02/2018    Baseline NSR:  Avg HR 92, no arrythmia, max 133, low 63   • ECHO - CONVERTED  08/24/2016    @[facility]. EF 60%   • ECHO - CONVERTED  2014    ([facility] CVA)    • ECHO - CONVERTED  08/24/2016    EF 60%, mild MR   • ECHO - CONVERTED  01/16/2019    @ [facility]. EF 65%. Mild MR. RVSP- 44 mmHg.       Current Outpatient Medications   Medication Sig Dispense Refill   • allopurinol (ZYLOPRIM) 300 MG tablet Take 300 mg by mouth Daily.     • aspirin 81 MG EC tablet Take 81 mg by mouth Daily.     • benzonatate (TESSALON PERLES) 100 MG capsule Take 1 capsule by mouth 3 (Three) Times a Day As Needed for Cough. 30 capsule 0   • budesonide-formoterol (SYMBICORT) 160-4.5 MCG/ACT inhaler Inhale 2 puffs 2 (Two) Times a Day.     • cyclobenzaprine (FLEXERIL) 5 MG tablet Take 5 mg by mouth 3 (Three) Times a Day As Needed for Muscle Spasms.     • DULoxetine (CYMBALTA) 60 MG capsule Take 60 mg by  mouth Daily.     • ELIQUIS 5 MG tablet tablet TAKE ONE TABLET BY MOUTH EVERY TWELVE HOURS 60 tablet 11   • esomeprazole (nexIUM) 20 MG capsule Take 20 mg by mouth 2 (Two) Times a Day.     • Fe-Succ Ac-C-Thre Ac-B12-FA (FERREX 150 FORTE PLUS)  MG capsule capsule Take  by mouth Daily With Breakfast.     • fenofibrate 160 MG tablet Take 160 mg by mouth Daily.     • folic acid (FOLVITE) 400 MCG tablet Take 400 mcg by mouth Daily.     • furosemide (LASIX) 40 MG tablet Take 40 mg by mouth Daily.     • Insulin Glargine (BASAGLAR KWIKPEN) 100 UNIT/ML injection pen Inject  under the skin.     • isosorbide mononitrate (IMDUR) 30 MG 24 hr tablet Take 30 mg by mouth Daily.     • lactulose (CHRONULAC) 10 GM/15ML solution Take 20 g by mouth Daily.     • levocetirizine (XYZAL) 5 MG tablet Take 5 mg by mouth Every Evening.     • meclizine (ANTIVERT) 25 MG tablet Take 25 mg by mouth Every 6 (Six) Hours As Needed for dizziness.     • metFORMIN (GLUCOPHAGE) 1000 MG tablet Take 1,000 mg by mouth 2 (Two) Times a Day With Meals.     • metoprolol succinate XL (TOPROL-XL) 100 MG 24 hr tablet Take 0.5 tablets by mouth 2 (Two) Times a Day. 90 tablet 2   • montelukast (SINGULAIR) 10 MG tablet Take 10 mg by mouth Every Night.     • nitroglycerin (NITROSTAT) 0.4 MG SL tablet 1 under the tongue as needed for angina, may repeat q5mins for up three doses 25 tablet 0   • potassium chloride (K-DUR,KLOR-CON) 20 MEQ CR tablet Take 20 mEq by mouth Daily.     • temazepam (RESTORIL) 30 MG capsule Take 30 mg by mouth Daily.     • Tiotropium Bromide Monohydrate (SPIRIVA RESPIMAT IN) Inhale.     • vitamin B-12 (CYANOCOBALAMIN) 500 MCG tablet Take 500 mcg by mouth 2 (Two) Times a Day.     • vitamin C (ASCORBIC ACID) 500 MG tablet Take 500 mg by mouth Daily.     • albuterol (PROVENTIL HFA;VENTOLIN HFA) 108 (90 Base) MCG/ACT inhaler Inhale 2 puffs Every 4 (Four) Hours As Needed for Wheezing.     • atorvastatin (LIPITOR) 20 MG tablet Take 20 mg by mouth  Daily.     • SITagliptin (JANUVIA) 100 MG tablet Take 100 mg by mouth Daily.       No current facility-administered medications for this visit.        Oxycodone; Penicillins; and Sulfa antibiotics    Past Medical History:   Diagnosis Date   • H/O cervical spine surgery    • History of cholecystectomy    • History of endometrial ablation    • Hypercholesterolemia    • Hyperlipidemia    • Hypertension    • Metabolic syndrome    • Pulmonary emboli (CMS/HCC)        Social History     Socioeconomic History   • Marital status:      Spouse name: Not on file   • Number of children: Not on file   • Years of education: Not on file   • Highest education level: Not on file   Tobacco Use   • Smoking status: Former Smoker     Last attempt to quit: 2004     Years since quitting: 15.9   • Smokeless tobacco: Never Used   Substance and Sexual Activity   • Alcohol use: No   • Drug use: No       Family History   Problem Relation Age of Onset   • Hypertension Mother    • Cancer Father    • Heart attack Maternal Uncle    • Hyperlipidemia Son        Review of Systems   Constitution: Positive for malaise/fatigue. Negative for diaphoresis and weakness.   HENT: Negative for ear pain, hoarse voice and nosebleeds.    Cardiovascular: Positive for dyspnea on exertion and palpitations. Negative for chest pain, claudication, irregular heartbeat, leg swelling, near-syncope and orthopnea.   Respiratory: Positive for shortness of breath. Negative for cough and wheezing.    Skin: Negative for dry skin, poor wound healing and rash.   Musculoskeletal: Positive for stiffness. Negative for back pain and joint pain.   Gastrointestinal: Negative for anorexia, constipation and heartburn.   Genitourinary: Negative for dysuria and hematuria.   Neurological: Negative for dizziness, light-headedness and loss of balance.   Psychiatric/Behavioral: Negative for depression and memory loss. The patient is not nervous/anxious.            Objective     /82  "(BP Location: Right arm)   Pulse 68   Ht 165.1 cm (65\")   Wt 89.4 kg (197 lb)   BMI 32.78 kg/m²     Physical Exam   Constitutional: She is oriented to person, place, and time. She appears well-developed and well-nourished.   HENT:   Head: Normocephalic and atraumatic.   Eyes: EOM are normal. Pupils are equal, round, and reactive to light.   Neck: Normal range of motion.   Cardiovascular: Normal rate and regular rhythm.   Murmur heard.  Pulmonary/Chest: Effort normal and breath sounds normal.   Abdominal: Soft.   Musculoskeletal: Normal range of motion.   Neurological: She is alert and oriented to person, place, and time. A cranial nerve deficit is present.   Skin: Skin is warm and dry.   Psychiatric: She has a normal mood and affect. Her behavior is normal.       Procedures    Assessment/Plan     HTN:  No changes in medication therapy advised. She will continue with metoprolol in regards. Blood pressure stable.      Palpitations:  Same regimen of metoprolol therapy continued BID as palpitations reported as rare. Limited caffeine intake advised.  Most recent holter showed only one short run SVT.      Chronic Angina:  Stable on imdur therapy.  No worse than prior.  Same dosing continued.  NTG therapy advised prn.  Refills sent.  Most recent stress showed no ischemia, results explained.    Hyperlipidemia:  Continue on statin therapy.  She does admit that her lipitor was changed to something else.  She can not recall but admits it was changed per your office.  Could we have most recent FLP that is followed by your office?  And could we have recent change in statin therapy for our office.    COPD:  Remains managed by pulmonary.  RVSP most recently noted at 44mmHg.  She continues on spiriva and symbicort therapy in regards.    DM:  No recent AIC available but she admits you are following for DM management. She remains insulin, januvia, and metformin therapy in regards. Can we have most recent?    BMI noted at 32.78, " good cardiac ADA diet with limited carb, calories, and walking regimen encouraged.    Most recent cardiac workup findings discussed with patient.    Refills of NTG and metoprolol sent per request.    6 month follow up recommended or sooner if needed.                  Problems Addressed this Visit        Cardiovascular and Mediastinum    HTN (hypertension) - Primary    Relevant Medications    metoprolol succinate XL (TOPROL-XL) 100 MG 24 hr tablet    Hyperlipemia       Endocrine    Type 2 diabetes mellitus without complication, without long-term current use of insulin (CMS/HCC)      Other Visit Diagnoses     Chronic fatigue disorder        Shortness of breath        Palpitations        Chronic stable angina (CMS/HCC)        Relevant Medications    nitroglycerin (NITROSTAT) 0.4 MG SL tablet    metoprolol succinate XL (TOPROL-XL) 100 MG 24 hr tablet    Chronic anemia        COPD mixed type (CMS/HCC)        Obesity (BMI 30.0-34.9)              Patient's Body mass index is 32.78 kg/m². BMI is above normal parameters. Recommendations include: nutrition counseling.                  Electronically signed by AYLIN Martínez November 29, 2019 12:08 PM

## 2020-05-21 RX ORDER — APIXABAN 5 MG/1
TABLET, FILM COATED ORAL
Qty: 60 TABLET | Refills: 11 | OUTPATIENT
Start: 2020-05-21

## 2020-06-16 RX ORDER — APIXABAN 5 MG/1
TABLET, FILM COATED ORAL
Qty: 60 TABLET | Refills: 3 | Status: SHIPPED | OUTPATIENT
Start: 2020-06-16 | End: 2020-09-21

## 2020-08-26 RX ORDER — METOPROLOL SUCCINATE 100 MG/1
TABLET, EXTENDED RELEASE ORAL
Qty: 90 TABLET | Refills: 0 | Status: SHIPPED | OUTPATIENT
Start: 2020-08-26 | End: 2020-12-09

## 2020-09-21 RX ORDER — APIXABAN 5 MG/1
TABLET, FILM COATED ORAL
Qty: 60 TABLET | Refills: 3 | Status: SHIPPED | OUTPATIENT
Start: 2020-09-21

## 2020-12-10 RX ORDER — METOPROLOL SUCCINATE 100 MG/1
TABLET, EXTENDED RELEASE ORAL
Qty: 30 TABLET | Refills: 0 | Status: SHIPPED | OUTPATIENT
Start: 2020-12-10

## 2021-01-11 RX ORDER — APIXABAN 5 MG/1
TABLET, FILM COATED ORAL
Qty: 60 TABLET | Refills: 3 | OUTPATIENT
Start: 2021-01-11

## 2021-01-11 RX ORDER — METOPROLOL SUCCINATE 100 MG/1
TABLET, EXTENDED RELEASE ORAL
Qty: 30 TABLET | Refills: 0 | OUTPATIENT
Start: 2021-01-11

## 2021-06-07 ENCOUNTER — OFFICE VISIT (OUTPATIENT)
Dept: CARDIOLOGY | Facility: CLINIC | Age: 56
End: 2021-06-07

## 2021-06-07 VITALS
HEART RATE: 76 BPM | BODY MASS INDEX: 34.16 KG/M2 | SYSTOLIC BLOOD PRESSURE: 100 MMHG | WEIGHT: 205 LBS | DIASTOLIC BLOOD PRESSURE: 60 MMHG | HEIGHT: 65 IN

## 2021-06-07 DIAGNOSIS — I10 ESSENTIAL HYPERTENSION: Primary | ICD-10-CM

## 2021-06-07 DIAGNOSIS — Z86.711 PERSONAL HISTORY OF PE (PULMONARY EMBOLISM): ICD-10-CM

## 2021-06-07 DIAGNOSIS — E78.00 PURE HYPERCHOLESTEROLEMIA: ICD-10-CM

## 2021-06-07 DIAGNOSIS — E11.9 TYPE 2 DIABETES MELLITUS WITHOUT COMPLICATION, WITHOUT LONG-TERM CURRENT USE OF INSULIN (HCC): ICD-10-CM

## 2021-06-07 PROCEDURE — 99213 OFFICE O/P EST LOW 20 MIN: CPT | Performed by: NURSE PRACTITIONER

## 2021-06-07 RX ORDER — ESOMEPRAZOLE MAGNESIUM 40 MG/1
40 CAPSULE, DELAYED RELEASE ORAL
COMMUNITY

## 2021-06-07 RX ORDER — NITROGLYCERIN 0.4 MG/1
TABLET SUBLINGUAL
Qty: 25 TABLET | Refills: 0 | Status: SHIPPED | OUTPATIENT
Start: 2021-06-07 | End: 2021-07-06

## 2021-06-07 RX ORDER — LEVOTHYROXINE SODIUM 175 UG/1
175 TABLET ORAL DAILY
COMMUNITY

## 2021-06-07 RX ORDER — DAPAGLIFLOZIN 10 MG/1
TABLET, FILM COATED ORAL DAILY
COMMUNITY

## 2021-06-07 RX ORDER — METHOCARBAMOL 500 MG/1
500 TABLET, FILM COATED ORAL 2 TIMES DAILY PRN
COMMUNITY

## 2021-06-07 RX ORDER — PRAVASTATIN SODIUM 40 MG
40 TABLET ORAL DAILY
COMMUNITY

## 2021-06-07 RX ORDER — SPIRONOLACTONE 100 MG/1
50 TABLET, FILM COATED ORAL DAILY
COMMUNITY

## 2021-06-07 RX ORDER — SUMATRIPTAN 50 MG/1
50 TABLET, FILM COATED ORAL
COMMUNITY

## 2021-06-07 NOTE — PROGRESS NOTES
Chief Complaint   Patient presents with   • Follow-up     Cardiac management   • Lab     Last labs a month ago per PCP. She is to be placed on liver transplant list.   • Chest Pain     Has occasional sharp pains under left breast and occasional burning sensation in mid chest that is short in duration. Having tightness in throat, having diffculty swallowing at times.   • Dizziness     Feels related to blood sugar.   • Med Refill     Needs refills on Nitro sl.     Subjective       Hedy Kelly is a 56 y.o. female with DM, HTN, COPD, anemia, and past history of PE.  She was seen at [facility] in August of 2016 for chest pain associated with diaphoresis. Troponin at that time was elevated.  She was then sent to [facility] where she had an echo that showed normal EF, mild MR. Cardiac cath was also done that showed essentially normal coronaries and normal LV function.  She continues Eliquis for history of PE. Holter for palpitations showed no significant arrhythmia. Cardiac workup in January 2019 for recurrent CP showed no ischemia.     She returns today for follow up. According to her, she will soon be placed on liver transplant list secondary to cirrhosis. She continues to follow with Dr. Mcclellan and Dr. Cardoza. She has persistent dyspepsia, heartburn, difficulty swallowing at times.  From a cardiac standpoint, no significant anginal chest pain, palpitations, TIA, bleeding. BP stable. She does admit to weakness and fatigue which have been chronic secondary to anemia.          Cardiac History:    Past Surgical History:   Procedure Laterality Date   • CARDIAC CATHETERIZATION  08/24/2016    Normal Coronaries   • CARDIAC CATHETERIZATION  2004    at [facility] after tPa. No stents.   • CARDIAC CATHETERIZATION  08/24/2016    EF 60%, normal coronaries   • CARDIOVASCULAR STRESS TEST  2014    ([facility] CVA)   • CARDIOVASCULAR STRESS TEST  01/23/2019    @ [facility]. L. Cardiolite- Negative. (Had nausea,vomitting and SOA.)   • CONVERTED (HISTORICAL) HOLTER   07/02/2018    Baseline NSR:  Avg HR 92, no arrythmia, max 133, low 63   • ECHO - CONVERTED  08/24/2016    @[facility]. EF 60%   • ECHO - CONVERTED  2014    ([facility] CVA)    • ECHO - CONVERTED  08/24/2016    EF 60%, mild MR   • ECHO - CONVERTED  01/16/2019    @ [facility]. EF 65%. Mild MR. RVSP- 44 mmHg.     Current Outpatient Medications   Medication Sig Dispense Refill   • albuterol (PROVENTIL HFA;VENTOLIN HFA) 108 (90 Base) MCG/ACT inhaler Inhale 2 puffs Every 4 (Four) Hours As Needed for Wheezing.     • allopurinol (ZYLOPRIM) 300 MG tablet Take 300 mg by mouth Daily.     • Dapagliflozin Propanediol (Farxiga) 10 MG tablet Take  by mouth Daily.     • DULoxetine (CYMBALTA) 60 MG capsule Take 60 mg by mouth Daily.     • Eliquis 5 MG tablet tablet TAKE ONE TABLET BY MOUTH EVERY TWELVE HOURS 60 tablet 3   • esomeprazole (nexIUM) 40 MG capsule Take 40 mg by mouth Every Morning Before Breakfast.     • Fluticasone Furoate-Vilanterol (Breo Ellipta) 100-25 MCG/INH inhaler Inhale 1 puff Daily.     • furosemide (LASIX) 40 MG tablet Take 40 mg by mouth Daily.     • Insulin Glargine (BASAGLAR KWIKPEN) 100 UNIT/ML injection pen Inject  under the skin.     • ipratropium-albuterol (COMBIVENT RESPIMAT)  MCG/ACT inhaler Inhale 1 puff 4 (Four) Times a Day.     • isosorbide mononitrate (IMDUR) 30 MG 24 hr tablet Take 30 mg by mouth Daily.     • lactulose (CHRONULAC) 10 GM/15ML solution Take 20 g by mouth Daily.     • levocetirizine (XYZAL) 5 MG tablet Take 5 mg by mouth Every Evening.     • levothyroxine (SYNTHROID, LEVOTHROID) 175 MCG tablet Take 175 mcg by mouth Daily.     • magnesium oxide (MAGOX) 400 (241.3 Mg) MG tablet tablet Take 400 mg by mouth 3 (Three) Times a Day.     • meclizine (ANTIVERT) 25 MG tablet Take 25 mg by mouth Every 6 (Six) Hours As Needed for dizziness.     • methocarbamol (ROBAXIN) 500 MG tablet Take 500 mg by mouth 2 (Two) Times a Day As Needed for Muscle Spasms.     • metoprolol succinate XL (TOPROL-XL) 100 MG 24 hr  tablet TAKE 1/2 TABLET BY MOUTH TWICE DAILY 30 tablet 0   • montelukast (SINGULAIR) 10 MG tablet Take 10 mg by mouth Every Night.     • nitroglycerin (NITROSTAT) 0.4 MG SL tablet 1 under the tongue as needed for angina, may repeat q5mins for up three doses 25 tablet 0   • Omega-3 Fatty Acids (OMEGA 3 PO) Take 2 g by mouth 2 (two) times a day.     • potassium chloride (K-DUR,KLOR-CON) 20 MEQ CR tablet Take 20 mEq by mouth Daily.     • pravastatin (PRAVACHOL) 40 MG tablet Take 40 mg by mouth Daily.     • SITagliptin (JANUVIA) 100 MG tablet Take 100 mg by mouth Daily.     • spironolactone (ALDACTONE) 100 MG tablet Take 50 mg by mouth Daily.     • SUMAtriptan (IMITREX) 50 MG tablet Take 50 mg by mouth Every 2 (Two) Hours As Needed for Migraine. Take one tablet at onset of headache. May repeat dose one time in 2 hours if headache not relieved.     • temazepam (RESTORIL) 30 MG capsule Take 30 mg by mouth Daily.       No current facility-administered medications for this visit.     Oxycodone, Penicillins, and Sulfa antibiotics    Past Medical History:   Diagnosis Date   • Cirrhosis of liver (CMS/HCC)    • H/O cervical spine surgery    • History of cholecystectomy    • History of endometrial ablation    • Hypercholesterolemia    • Hyperlipidemia    • Hypertension    • Metabolic syndrome    • Pulmonary emboli (CMS/HCC)      Social History     Socioeconomic History   • Marital status:      Spouse name: Not on file   • Number of children: Not on file   • Years of education: Not on file   • Highest education level: Not on file   Tobacco Use   • Smoking status: Former Smoker     Quit date:      Years since quittin.4   • Smokeless tobacco: Never Used   Vaping Use   • Vaping Use: Never used   Substance and Sexual Activity   • Alcohol use: No   • Drug use: No     Family History   Problem Relation Age of Onset   • Hypertension Mother    • Cancer Father    • Heart attack Maternal Uncle    • Hyperlipidemia Son       "    Review of Systems   Constitutional: Positive for malaise/fatigue and weight gain (up 8 lb). Negative for decreased appetite.   HENT: Negative.    Eyes: Negative for blurred vision.   Cardiovascular: Negative for chest pain, dyspnea on exertion, leg swelling, palpitations and syncope.   Respiratory: Negative for shortness of breath and sleep disturbances due to breathing.    Endocrine: Negative.    Hematologic/Lymphatic: Negative for bleeding problem. Does not bruise/bleed easily.   Skin: Negative.    Musculoskeletal: Negative for falls and myalgias.   Gastrointestinal: Positive for bloating, dysphagia and heartburn. Negative for abdominal pain and melena.   Genitourinary: Negative for hematuria.   Neurological: Positive for dizziness. Negative for light-headedness.   Psychiatric/Behavioral: Negative for altered mental status.   Allergic/Immunologic: Negative.       Objective     /60 (BP Location: Right arm)   Pulse 76   Ht 165.1 cm (65\")   Wt 93 kg (205 lb)   BMI 34.11 kg/m²     Vitals and nursing note reviewed.   Constitutional:       General: Not in acute distress.     Appearance: Well-developed. Not diaphoretic.   Eyes:      Pupils: Pupils are equal, round, and reactive to light.   HENT:      Head: Normocephalic.   Pulmonary:      Effort: Pulmonary effort is normal. No respiratory distress.      Breath sounds: Normal breath sounds.   Cardiovascular:      Normal rate. Regular rhythm.   Pulses:     Intact distal pulses.   Abdominal:      General: Bowel sounds are normal. There is distension.      Palpations: Abdomen is soft.   Musculoskeletal: Normal range of motion.      Cervical back: Normal range of motion. Skin:     General: Skin is warm and dry.   Neurological:      Mental Status: Alert and oriented to person, place, and time.        Procedures          Problem List Items Addressed This Visit        Cardiac and Vasculature    HTN (hypertension) - Primary    Relevant Medications    spironolactone " (ALDACTONE) 100 MG tablet    Hyperlipemia    Relevant Medications    pravastatin (PRAVACHOL) 40 MG tablet       Coag and Thromboembolic    Personal history of PE (pulmonary embolism)       Endocrine and Metabolic    Type 2 diabetes mellitus without complication, without long-term current use of insulin (CMS/HCC)    Relevant Medications    Dapagliflozin Propanediol (Farxiga) 10 MG tablet         1. HTN- well controlled. Continue metoprolol, spironolactone, and Lasix.     2. Hyperlipidemia- managed with pravastatin. Labs followed by PCP, GI and hemonc. None available for review. She has normal coronaries and negative stress. If statin needs to be discontinued secondary to liver complications, may do so. She is going to discuss with Dr. Mcclellan.     3. Palpitations- rhythm regular, symptoms controlled with BB. Continue same.     4. Diabetes- IDDM, encouraged low carb, low sugar diet. She follows with Dr. Gupta.     5. PE- long-term NOAC, no bleeding noted. No worsening dyspnea. Continue to monitor.     6. Liver dysfunction/?Cirrhosis- followed by GI with worsening LFT. To be placed on transplant list. Data deficient.     No changes made today. We will see her back in six months. From a cardiac standpoint, she appears stable.     Patient's Body mass index is 34.11 kg/m². indicating that she is obese (BMI >30). Obesity-related health conditions include the following: obstructive sleep apnea, hypertension, diabetes mellitus, dyslipidemias and GERD. Obesity is worsening. BMI is is above average; BMI management plan is completed. We discussed portion control and increasing exercise..               Electronically signed by AYLIN Santiago,  June 9, 2021 10:51 EDT

## 2021-06-09 PROBLEM — K74.69 OTHER CIRRHOSIS OF LIVER (HCC): Status: ACTIVE | Noted: 2021-06-09

## 2021-07-06 RX ORDER — NITROGLYCERIN 0.4 MG/1
TABLET SUBLINGUAL
Qty: 25 TABLET | Refills: 1 | Status: SHIPPED | OUTPATIENT
Start: 2021-07-06 | End: 2022-02-14 | Stop reason: SDUPTHER

## 2022-02-14 ENCOUNTER — TELEPHONE (OUTPATIENT)
Dept: CARDIOLOGY | Facility: CLINIC | Age: 57
End: 2022-02-14

## 2022-02-14 ENCOUNTER — OFFICE VISIT (OUTPATIENT)
Dept: CARDIOLOGY | Facility: CLINIC | Age: 57
End: 2022-02-14

## 2022-02-14 VITALS
HEART RATE: 64 BPM | TEMPERATURE: 97.3 F | WEIGHT: 202.8 LBS | DIASTOLIC BLOOD PRESSURE: 70 MMHG | SYSTOLIC BLOOD PRESSURE: 122 MMHG | BODY MASS INDEX: 33.79 KG/M2 | HEIGHT: 65 IN

## 2022-02-14 DIAGNOSIS — G47.33 OBSTRUCTIVE SLEEP APNEA SYNDROME: ICD-10-CM

## 2022-02-14 DIAGNOSIS — K74.69 OTHER CIRRHOSIS OF LIVER: ICD-10-CM

## 2022-02-14 DIAGNOSIS — I10 PRIMARY HYPERTENSION: Primary | ICD-10-CM

## 2022-02-14 DIAGNOSIS — R00.2 PALPITATIONS: ICD-10-CM

## 2022-02-14 DIAGNOSIS — Z86.711 PERSONAL HISTORY OF PE (PULMONARY EMBOLISM): ICD-10-CM

## 2022-02-14 DIAGNOSIS — E78.00 PURE HYPERCHOLESTEROLEMIA: ICD-10-CM

## 2022-02-14 PROCEDURE — 99214 OFFICE O/P EST MOD 30 MIN: CPT | Performed by: NURSE PRACTITIONER

## 2022-02-14 RX ORDER — PRIMIDONE 50 MG/1
50 TABLET ORAL DAILY
COMMUNITY

## 2022-02-14 RX ORDER — PROCHLORPERAZINE 25 MG/1
SUPPOSITORY RECTAL
COMMUNITY

## 2022-02-14 RX ORDER — ARIPIPRAZOLE 2 MG/1
2 TABLET ORAL DAILY
COMMUNITY

## 2022-02-14 RX ORDER — HYDROXYZINE PAMOATE 25 MG/1
25 CAPSULE ORAL 3 TIMES DAILY PRN
COMMUNITY

## 2022-02-14 RX ORDER — NITROGLYCERIN 0.4 MG/1
TABLET SUBLINGUAL
Qty: 25 TABLET | Refills: 1 | Status: SHIPPED | OUTPATIENT
Start: 2022-02-14

## 2022-02-14 NOTE — PATIENT INSTRUCTIONS
Vitamin K Foods and Warfarin  Warfarin is a blood thinner (anticoagulant). Anticoagulant medicines help prevent the formation of blood clots. Warfarin works by blocking the activity of vitamin K, which promotes normal blood clotting.  When you take warfarin, problems can occur from suddenly increasing or decreasing the amount of vitamin K that you eat from one day to the next. These problems can occur due to varying levels of warfarin in your blood. Problems may include:  · Blood clots.  · Bleeding.  What are tips for eating the right amount of vitamin K?  To avoid problems when taking warfarin:  · Eat a balanced diet that includes:  ? Fresh fruits and vegetables.  ? Whole grains.  ? Low-fat dairy products.  ? Lean proteins, such as fish, eggs, and lean cuts of meat.  · Keep your intake of vitamin K consistent from day to day. To do this:  ? Avoid eating large amounts of vitamin K one day and low amounts of vitamin K the next day.  ? If you take a multivitamin that contains vitamin K, be sure to take it every day.  ? Know which foods contain vitamin K. Read food labels. Use the lists below to understand serving sizes and the amount of vitamin K in one serving.  · Avoid major changes in your diet. If you are going to change your diet, talk with your health care provider before making changes.  · Work with a nutrition specialist (dietitian) to develop a meal plan that works best for you.  What foods are high in vitamin K?  Foods that are high in vitamin K contain more than 100 mcg (micrograms) per serving. These include:  · Broccoli (cooked from fresh) - ½ cup (78 g) has 110 mcg.  · [place] sprouts (cooked from fresh) - ½ cup (78 g) has 109 mcg.  · Greens, beet (cooked from fresh) - ½ cup (72 g) has 350 mcg.  · Greens, latoya (cooked from fresh) - ½ cup (66 g) has 263 mcg.  · Greens, turnip (cooked from fresh) - ½ cup (72 g) has 265 mcg.  · Green onions or scallions - ½ cup (50 g) has 105 mcg.  · Kale (cooked from  fresh) - ½ cup (68 g) has 536 mcg.  · Parsley (raw) - 10 sprigs (10 g) has 164 mcg.  · Spinach (cooked from fresh) - ½ cup (90 g) has 444 mcg.  · Swiss chard (cooked from fresh) - ½ cup (88 g) has 287 mcg.  What foods have a moderate amount of vitamin K?  Foods that have a moderate amount of vitamin K contain  mcg per serving. These include:  · Asparagus (cooked from fresh) - 4 mcfarlane (60 g) have 30 mcg.  · Black-eyed peas (dried) - ½ cup (85 g) has 32 mcg.  · Cabbage (cooked from fresh) - ½ cup (78 g) has 84 mcg.  · Cabbage (raw) - ½ cup (35 g) has 26 mcg.  · Kiwi fruit - 1 medium (69 g) has 27 mcg.  · Lettuce (raw) - 1 cup (36 g) has 45 mcg.  · Okra (cooked from fresh) - ½ cup (80 g) has 32 mcg.  · Prunes (dried) - 5 prunes (47 g) have 25 mcg.  · Watercress (raw) - 1 cup (34 g) has 85 mcg.  What foods are low in vitamin K?  Foods low in vitamin K contain less than 25 mcg per serving. These include:  · Artichoke - 1 medium (128 g) has 18 mcg.  · Avocado - 1 oz (21 g) has 6 mcg.  · Blueberries - ½ cup (73 g) has 14 mcg.  · Carrots (cooked from fresh) - ½ cup (78 g) has 11 mcg.  · Cauliflower (raw) - ½ cup (54 g) has 8 mcg.  · Cucumber with peel (raw) - ½ cup (52 g) has 9 mcg.  · Grapes - ½ cup (76 g) has 12 mcg.  · Ruslan - 1 medium (207 g) has 9 mcg.  · Mixed nuts - 1 cup (142 g) has 17 mcg.  · Pear - 1 medium (178 g) has 8 mcg.  · Peas (cooked from fresh) - ½ cup (80 g) has 20 mcg.  · Pickled cucumber - 1 spear (65 g) has 11 mcg.  · Sauerkraut (canned) - ½ cup (118 g) has 16 mcg.  · Soybeans (cooked from fresh) - ½ cup (86 g) has 16 mcg.  · Tomato (raw) - 1 medium (123 g) has 10 mcg.  · Tomato sauce (raw) - ½ cup (123 g) has 17 mcg.  What foods do not have vitamin K?  If a food contains less than 5 mcg per serving, it is considered to have no vitamin K. These foods include:  · Bread and cereal products.  · Cheese.  · Eggs.  · Fish and shellfish.  · Meat and poultry.  · Milk and dairy products.  · Seeds, such  as sunflower or pumpkin seeds.  The items listed above may not be a complete list of foods that have high, moderate, and low amounts of vitamin K or do not have vitamin K. Actual amounts of vitamin K may differ depending on processing. Contact a dietitian for more information.  Summary  · Warfarin is an anticoagulant that prevents blood clots by blocking the activity of vitamin K. It is important to monitor the content of vitamin K in your foods and to be consistent with the amount of vitamin K you take in each day.  · Avoid major changes in your diet. If you are going to change your diet, talk with your health care provider before making changes.  This information is not intended to replace advice given to you by your health care provider. Make sure you discuss any questions you have with your health care provider.  Document Revised: 10/06/2020 Document Reviewed: 10/06/2020  [employer] Patient Education © 2021 [employer]  Prothrombin Time, International Normalized Ratio Test  Why am I having this test?  A prothrombin time (pro-time, PT) test may be ordered if:  · You have certain medical conditions that cause abnormal bleeding or blood clotting. These can include:  ? Liver disease.  ? Systemic infection (sepsis).  ? Inherited (genetic) bleeding disorders.  · You are taking a medicine to prevent excessive blood clotting (anticoagulant), such as warfarin.  ? If you are taking warfarin, you will likely be asked to have this test done at regular intervals. The results of this test will help your health care provider determine what dose of warfarin you need based on how quickly or slowly your blood clots. It is very important to have this test done as often as your health care provider recommends.  What is being tested?  A prothrombin time (pro-time, PT) test measures how many seconds it takes your blood to clot. The international normalized ratio (INR) is a calculation of blood clotting time based on your PT result.  Most labs report both PT and INR values when reporting blood clotting times.  What kind of sample is taken?    A blood sample is required for this test. It is usually collected by inserting a needle into a blood vessel.  Tell a health care provider about:  · Any blood disorders you have.  · All medicines you are taking, including vitamins, herbs, eye drops, creams, and over-the-counter medicines. Do not stop, add, or change any medicines without letting your health care provider know.  · The foods you regularly eat, especially foods that contain moderate or high amounts of vitamin K. It is important to eat a consistent amount of foods rich in vitamin K. Let your health care provider know if you have recently changed your diet.  · If you drink alcohol. This can affect your lab results.  How are the results reported?  Your test results will be reported as values. Your health care provider will compare your results to normal ranges that were established after testing a large group of people (reference ranges). Reference ranges may vary among different labs and hospitals. For this test, common reference ranges are:  · Without anticoagulant treatment (control value): 11.0-12.5 seconds; %.  · INR: 0.8-1.1.  If you are taking warfarin, talk with your health care provider about what your INR result should be. Generally, an INR of 2.0-3.0 is desired for blood clot prevention. This depends on your medical conditions.  What do the results mean?  · A higher than normal PT or INR means that your blood takes longer to form a clot. This can result from:  ? Certain medicines.  ? Liver disease.  ? Lack of certain proteins that form clots (coagulation factors).  ? Lack of some vitamins.  · A lower than normal PT or INR means that your blood can form a clot easily. This can result from:  ? Supplements that contain Vitamin K.  ? Medicines that contain estrogen, such as birth control pills or hormone  replacement.  ? Cancer.  ? Some blood disorders (disseminated intravascular coagulation).  Talk with your health care provider about what your test results mean.  If you are taking warfarin or another anticoagulant, your result ranges may be different. Talk to your health care provider about what your results should be.  Questions to ask your health care provider  Ask your health care provider or the department that is doing the test:  · When will my results be ready?  · How will I get my results?  · What are my treatment options?  · What other tests do I need?  · What are my next steps?  Summary  · A prothrombin time (pro-time, PT) test measures how many seconds it takes your blood to clot.  · You may have this test if you have a medical condition that causes abnormal bleeding or blood clotting, or if you are taking a medicine to prevent abnormal blood clotting.  · A test result that is higher than normal indicates that your blood is taking too long to form a clot. This result may occur because you lack some vitamins, take certain medicines, or have certain medical conditions.  · Talk with your health care provider about what your results mean.  This information is not intended to replace advice given to you by your health care provider. Make sure you discuss any questions you have with your health care provider.  Document Revised: 02/02/2019 Document Reviewed: 02/02/2019  [employer] Patient Education © 2021 [employer]  Warfarin Information  Warfarin is a blood thinner (anticoagulant). Anticoagulants help to prevent the formation of blood clots. They also help to stop the growth of blood clots. Your health care provider will monitor the anticoagulation effect of warfarin closely and will adjust your medicine as needed.  Who should use warfarin?  Warfarin is prescribed for people who have blood clots, or who are at risk for developing harmful blood clots, such as people who:  · Have mechanical heart valves.  · Have  irregular heart rhythms (atrial fibrillation).  · Have certain clotting disorders.  · Have had blood clots in the past or are currently receiving treatment for them. This includes people who have had a stroke, blood clots in the lungs (pulmonary embolism, or PE), or blood clots in the legs (deep vein thrombosis, or DVT).  How is warfarin taken?  Warfarin is taken by mouth (orally). Warfarin tablets come in different strengths. The strength is printed on the tablet and each strength is a different color. If you get a new prescription filled and the color of your tablet is different than usual, tell your pharmacist or health care provider immediately.  · Take warfarin exactly as told by your health care provider. Doing this helps you avoid bleeding or blood clots that could result in serious injury, pain, or disability.  · Take your medicine at the same time every day. If you forget to take your dose of warfarin, take it as soon as you remember that day. If you do not remember on that day, do not take an extra dose the next day.  · Contact your health care provider if you miss or take an extra dose. Do not change your dosage on your own to make up for missed or extra doses.  What blood tests do I need while taking warfarin?    Warfarin is a medicine that needs to be closely monitored. It is very important to keep all lab visits and follow-up visits with your health care provider. While taking warfarin, you will need to have blood tests regularly to measure your blood clotting time. These tests are called prothrombin tests (PT)or international normalized ratio (INR) tests. These tests can be done with a finger stick or a blood draw.  What does the INR test result mean?  The PT test results will be reported as the INR. The INR tells your health care provider whether your dosage of warfarin needs to be changed. The longer it takes your blood to clot, the higher the INR.  Your health care provider will tell you your  target INR range. If your INR is not in your target range, your health care provider may adjust your dosage.  · If your INR is above your target range, there is a risk of bleeding. Your dosage of warfarin may need to be decreased.  · If your INR is below your target range, there is a risk of clotting. Your dosage of warfarin may need to be increased.  How often is the INR test needed?  · When you first start warfarin, you will usually have your INR checked every few days.  · You may need to have INR tests done more than once a week until the health care provider determines the correct dosage of warfarin.  · After you have reached your target INR, your INR will be tested less often. However, you will need to have your INR checked at least once every 4-6 weeks for the entire time you are taking warfarin.  · Some people may qualify for a home monitoring program to check their INR. Ask your health care provider if you qualify for this program.  What are the side effects of warfarin?  Too much warfarin can cause bleeding or hemorrhage in any part of the body, such as:  · Bleeding from the gums.  · Unexplained bruises or bruises that get larger.  · Blood in the urine or stools (feces) that are bloody or dark in color.  · Bleeding in the brain (hemorrhagic stroke).  · A nosebleed that is not easily stopped.  · Coughing up or vomiting blood.  Warfarin use may also cause:  · Skin rash or irritations.  · Nausea that does not go away.  · Severe pain in the back or joints.  · Painful toes that turn blue or purple (purple toe syndrome).  · Painful ulcers that do not go away (skin necrosis).  What precautions do I need to take while using warfarin?  · Wear or carry identification that says that you are taking warfarin.  · Make sure that all health care providers, including your dentist, know you are taking warfarin.  · If you need surgery, talk with your health care provider about whether you should stop taking warfarin before  your surgery.  · Avoid situations that cause bleeding. You may bleed more easily while taking warfarin. To limit bleeding, take the following actions:  ? Use a softer toothbrush.  ? Floss with waxed floss, not unwaxed floss.  ? Shave with an electric razor, not with a blade.  ? Limit your use of sharp objects.  ? Avoid activities that put you at risk for injury, such as contact sports.  What do I need to know about warfarin and pregnancy or breastfeeding?  · Warfarin is not recommended during the first trimester of pregnancy due to an increased risk of birth defects. In certain situations, a woman may take warfarin after her first trimester of pregnancy.  · If you are taking warfarin and you become pregnant or plan to become pregnant, contact your health care provider right away.  · If you plan to breastfeed while taking warfarin, talk with your health care provider first.  What do I need to know about warfarin and alcohol or drug use?  · Avoid drinking alcohol, or limit alcohol intake to no more than 1 drink a day for nonpregnant women and 2 drinks a day for men.  ? Be aware of how much alcohol is in your drink. In the [place] one drink equals one 12 oz bottle of beer (355 mL), one 5 oz glass of wine (148 mL), or one 1½ oz glass of hard liquor (44 mL).  ? If you change the amount of alcohol that you drink, tell your health care provider. Your warfarin dosage may need to be changed.  · Avoid tobacco products, such as cigarettes, e-cigarettes, and chewing tobacco. If you need help quitting, ask your health care provider.  ? If you change the amount of nicotine or tobacco that you use, tell your health care provider. Your warfarin dosage may need to be changed.  · Avoid street drugs while taking warfarin. The effects of street drugs on warfarin are not known.  What do I need to know about warfarin and other medicines or supplements?  · Many prescription and over-the-counter medicines can interfere with warfarin. Talk  with your health care provider or your pharmacist before starting or stopping any new medicines. This includes over-the-counter vitamins, dietary supplements, herbal medicines, and pain medicines. Your warfarin dosage may need to be adjusted.  · Some common over-the-counter medicines that may increase the risk of bleeding while taking warfarin include:  ? Aspirin.  ? NSAIDs, such as ibuprofen or naproxen.  ? Vitamin E.  ? Fish oils.  What do I need to know about warfarin and my diet?  · It is important to maintain a normal, balanced diet while taking warfarin. Avoid major changes in your diet. If you are going to change your diet, talk with your health care provider before making changes.  · Your health care provider may recommend that you work with a dietitian.  · Vitamin K decreases the effect of warfarin, and it is found in many foods. Eat a consistent amount of foods that contain vitamin K. For example, you may decide to eat 2 vitamin K-containing foods each day.  Contact a health care provider if:  · You miss a dose.  · You take an extra dose.  · You plan to have any kind of surgery or procedure.  · You are unable to take your medicine due to nausea, vomiting, or diarrhea.  · You have any major changes in your diet or you plan to make any major changes in your diet.  · You start or stop any over-the-counter medicine, prescription medicine, herbal supplement, or dietary supplement.  · You become pregnant, plan to become pregnant, or think you may be pregnant.  · You have menstrual periods that are heavier than usual.  · You have unusual bruising.  Get help right away if you:  · Have signs of an allergic reaction, such as:  ? Swelling of the lips, face, tongue, mouth, or throat.  ? Rash or itchy, red, swollen areas of skin (hives).  ? Trouble breathing.  ? Chest tightness.  · Fall or have an accident, especially if you hit your head.  · Have signs that your blood is too thin, such as:  ? Blood in your urine. Your  "urine may look reddish, pinkish, or tea-colored.  ? Blood in your stool. Your stool may be black or bright red.  ? You vomit blood or cough up blood. The blood may be bright red, or it may look like coffee grounds.  ? Bleeding that does not stop after applying pressure to the area for 30 minutes.  · Have signs of a blood clot in your leg or arm, such as:  ? Pain or swelling in your leg or arm.  ? Skin that is red or warm to the touch on your arm or leg.  · Have signs of blood in your lung, such as:  ? Shortness of breath or difficulty breathing.  ? Chest pain.  ? Unexplained fever.  · Have any symptoms of a stroke. \"BE FAST\" is an easy way to remember the main warning signs of a stroke:  ? B - Balance. Signs are dizziness, sudden trouble walking, or loss of balance.  ? E - Eyes. Signs are trouble seeing or a sudden change in vision.  ? F - Face. Signs are sudden weakness or numbness of the face, or the face or eyelid drooping on one side.  ? A - Arms. Signs are weakness or numbness in an arm. This happens suddenly and usually on one side of the body.  ? S - Speech. Signs are sudden trouble speaking, slurred speech, or trouble understanding what people say.  ? T - Time. Time to call emergency services. Write down what time symptoms started.  · Have other signs of a stroke, such as:  ? A sudden, severe headache with no known cause.  ? Nausea or vomiting.  ? Seizure.  · Have other signs of a reaction to warfarin, such as:  ? Purple or blue toes.  ? Skin ulcers that do not go away.  These symptoms may represent a serious problem that is an emergency. Do not wait to see if the symptoms will go away. Get medical help right away. Call your local emergency services (911 in the [place] Do not drive yourself to the hospital.  Summary  · Warfarin is a medicine that thins blood. It is used to prevent or treat blood clots. You must be monitored closely while on this medicine. Keep all follow-up visits.  · Make sure that you know " your target INR range and your warfarin dosage.  · Wear or carry identification that says that you are taking warfarin.  · Take warfarin at the same time every day. Call your health care provider if you miss a dose or if you take an extra dose. Do not change the dosage of warfarin on your own.  · Know the signs and symptoms of blood clots, bleeding, and a stroke. Know when to get emergency medical help.  This information is not intended to replace advice given to you by your health care provider. Make sure you discuss any questions you have with your health care provider.  Document Revised: 10/16/2020 Document Reviewed: 10/16/2020  [employer] Patient Education © 2021 [employer]  Warfarin Coagulopathy  Warfarin coagulopathy refers to bleeding that may occur as a complication of the medicine warfarin. Warfarin is a blood thinner (anticoagulant). Anticoagulants prevent dangerous blood clots. Bleeding is the most common and most serious complication of warfarin.  While taking warfarin, you will need to have blood tests (prothrombin tests, or PT tests) regularly to measure your blood clotting time. The PT test results will be reported as the International Normalized Ratio (INR). The INR tells your health care provider whether your dosage of warfarin needs to be changed. The longer it takes your blood to clot, the higher the INR. Your risk of warfarin coagulopathy increases as your INR increases.  What are the causes?  This condition may be caused by:  · Taking too much warfarin (overdose).  · Underlying medical conditions.  · Changes to your diet.  · Interactions with medicines, supplements, or alcohol.  What are the signs or symptoms?  Warfarin coagulopathy may cause bleeding from any tissue or organ. Symptoms may include:  · Bleeding from the gums.  · A nosebleed that is not easily stopped.  · Blood in stool. This may look like bright red, dark, or black, tarry stools.  · Blood in urine. This may look like pink, red,  or brown urine.  · Unusual bruising or bruising easily.  · A cut that does not stop bleeding within 10 minutes.  · Coughing up blood.  · Vomiting blood.  · Feeling nauseous for longer than 1 day.  · Broken blood vessels in the eye (subconjunctival hemorrhage). This may look like a bright red or dark red patch on the white part of the eye.  · Abdominal or back pain with or without bruising.  · Sudden, severe headache.  · Sudden weakness or numbness of the face, arm, or leg, especially on one side of the body.  · Sudden confusion.  · Difficulty speaking (aphasia) or understanding speech.  · Sudden trouble seeing out of one or both eyes.  · Unexpected difficulty walking.  · Dizziness.  · Loss of balance or coordination.  · Unusual vaginal bleeding.  · Swelling or pain at an injection site.  · Skin scarring due to tissue death (necrosis) of fatty tissue. This may cause pain in the waist, thighs, or buttocks. This is more common among women.  How is this diagnosed?  This condition is diagnosed after your health care provider places you on warfarin and then finds out how it affects your blood's ability to clot. Prothrombin time (PT) clotting tests are used to monitor your clotting factor. These tests also help your health care provider to find the warfarin dose that is best for you.  How is this treated?  This condition is treated with vitamin K. Vitamin K helps the blood to clot. You may receive vitamin K every 12 hours, or as needed. You may also receive donated plasma (transfusions of fresh frozen plasma). Plasma is the liquid part of blood, and contains substances that help the blood clot.  Follow these instructions at home:  Medicines  · Take warfarin exactly as told by your health care provider. This ensures that you avoid bleeding or clots that could result in serious injury, pain, or disability.  · Take your medicine at the same time every day. If you forget to take your dose of warfarin, take it as soon as you  remember on that day. If you do not remember to take it on that day, do not take an extra dose the next day.  · Contact your health care provider if you miss a dose or take an extra dose. Do not change your dosage on your own to make up for missed or extra doses.  · Talk with your health care provider or your pharmacist before starting or stopping any new medicines. Many prescription and over-the-counter medicines can interfere with warfarin. This includes over-the-counter vitamins, dietary supplements, herbal medicines, and pain medicines. Your warfarin dosage may need to be adjusted.  Eating and drinking  · It is important to maintain a normal, balanced diet while taking warfarin. Avoid major changes in your diet. If you are planning to change your diet, talk with your health care provider before making changes.  · Your health care provider may recommend that you work with a diet and nutrition specialist (dietitian).  · Vitamin K makes warfarin less effective. It is found in many foods. Eat a consistent amount of foods that contain vitamin K. For example, you may decide to eat 2 vitamin K-containing foods each day. Your warfarin dose is set according to the amount of vitamin K in your blood.  · Eat a consistent amount of foods that contain vitamin K. Vitamin K makes warfarin less effective, so eating the same amount each day enables your health care provider to set the correct dose of warfarin. You may decide to eat 2 vitamin K-containing foods each day.  Tests    · Make sure to have PT tests at least once every 4-6 weeks for the entire time you are taking warfarin.  · Ask your health care provider what your target INR range is. Make sure you always know your target range. If your INR is not in your target range, your health care provider may adjust your dosage.    Preventing bleeding and injury  · Some common over-the-counter medicines and supplements may increase the risk of bleeding while taking warfarin. They  include:  ? Acetaminophen.  ? Aspirin.  ? NSAIDs, such as ibuprofen or naproxen.  ? Vitamin E.  · Avoid situations that cause bleeding. You may bleed more easily while taking warfarin. To limit bleeding, take the following actions:  ? Use a softer toothbrush.  ? Floss with waxed floss, not unwaxed floss.  ? Shave with an electric razor, not with a blade.  ? Limit your use of sharp objects.  ? Avoid potentially harmful activities, such as contact sports.  General instructions  · Wear or carry identification that says that you are taking warfarin.  · Make sure that all health care providers, including your dentist, know that you are taking warfarin.  · If you need surgery, tell your health care provider that you are taking warfarin. You may have to stop taking warfarin before your surgery.  · If you plan to breastfeed or become pregnant while taking warfarin, talk with your health care provider.  · Avoid alcohol, tobacco, and drugs.  ? If your health care provider approves, limit alcohol intake to no more than 1 drink a day for non-pregnant women and 2 drinks a day for men. One drink equals 12 oz. of beer, 5 oz. of wine, or 1½ oz. of hard liquor.  ? If you change the amount of nicotine, tobacco, or alcohol you use, tell your health care provider.  · Keep all follow-up visits and lab visits as told by your health care provider. This is very important because warfarin is a medicine that needs to be closely monitored.  Contact a health care provider if:  · You miss a dose.  · You take an extra dose.  · You plan to have any kind of surgery or procedure.  · You are unable to take your medicine due to nausea, vomiting, or diarrhea.  · You have any major changes in your diet, or you plan to make major changes in your diet.  · You start or stop any over-the-counter medicine, prescription medicine, or dietary supplement.  · You become pregnant, plan to become pregnant, or think you may be pregnant.  · You have menstrual  periods that are heavier than usual, or unusual vaginal bleeding.  · You have unusual bruising.  · You lose your appetite.  · You have a fever.  · You have diarrhea that lasts for more than 24 hours.  Get help right away if:  · You develop symptoms of an allergic reaction, such as:  ? Swelling of the lips, face, tongue, mouth, or throat.  ? Rash.  ? Itching.  ? Itchy, red, swollen areas of skin (hives).  ? Trouble breathing.  ? Chest tightness.  · You have any symptoms of stroke. BEFAST is an easy way to remember the main warning signs of stroke:  ? B - Balance. Signs are dizziness, sudden trouble walking, or loss of balance.  ? E - Eye. Signs are trouble seeing or a sudden change in vision.  ? F - Face. Signs are sudden weakness or numbness of the face, or the face or eyelid drooping on one side.  ? A - Arm. Signs are weakness or numbness in an arm. This happens suddenly and usually on one side of the body.  ? S - Speech. Signs are trouble speaking, slurred speech, or trouble understanding speech.  ? T - Time. Time to call emergency services. Write down the time your symptoms started.  · You have other signs of stroke, such as:  ? A sudden, severe headache with no known cause.  ? Nausea or vomiting.  ? Seizure.  · You have signs or symptoms of a blood clot, such as:  ? Pain or swelling in your leg or arm.  ? Skin that is red or warm to the touch on your arm or leg.  ? Shortness of breath or difficulty breathing.  ? Chest pain.  ? Unexplained fever.  · You have:  ? A fall or have an accident, especially if you hit your head.  ? Blood in your urine. Your urine may look reddish, pinkish, or tea-colored.  ? Blood in your stool. Your stool may be black or bright red.  ? Bleeding that does not stop after applying pressure to the area for 30 minutes.  ? Severe pain in your joints or back.  ? Purple or blue toes.  ? Skin ulcers that do not go away.  · You vomit blood or cough up blood. The blood may be bright red, or it  may look like coffee grounds.  These symptoms may represent a serious problem that is an emergency. Do not wait to see if the symptoms will go away. Get medical help right away. Call your local emergency services (911 in the [place] Do not drive yourself to the hospital.  Summary  · Warfarin needs to be closely monitored with blood tests. It is very important to keep all lab visits and follow-up visits with your health care provider. Make sure you know your target INR range and your warfarin dosage.  · Monitor how much vitamin K you eat every day. Try to eat the same amount every day.  · Wear or carry identification that says that you are taking warfarin.  · Take warfarin at the same time every day. Call your health care provider if you miss a dose or if you take an extra dose. Do not change the dosage of warfarin on your own.  · Know the signs and symptoms of blood clots, bleeding, and stroke. Know when to get emergency medical help.  This information is not intended to replace advice given to you by your health care provider. Make sure you discuss any questions you have with your health care provider.  Document Revised: 06/17/2021 Document Reviewed: 06/17/2021  Elsevier Patient Education © 2021 [employer]

## 2022-02-14 NOTE — TELEPHONE ENCOUNTER
Please call Dr. Gupta office to see if they can start and monitor her warfarin as recommended by [facility] liver transplant team.

## 2022-02-14 NOTE — PROGRESS NOTES
Chief Complaint   Patient presents with   • Follow-up     Cardiac management . Patient is going to  [facility] and is on waiting list to tecve a Liver, they have recommended changing  Eliquis to Coumadin  through this process.    • LABS     Had labs done per PCP about 3 months ago   • Med Refill     Needs refills on Nitroglycerin  to [place] pharmacy. She has not needed it , but likes to keep it on hand if needed .       Subjective       Hedy Kelly is a 56 y.o. female  with DM, HTN, COPD, anemia, and past history of PE.  She was seen at [facility] in August of 2016 for chest pain associated with diaphoresis. Troponin at that time was elevated.  She was then sent to [facility] where she had an echo that showed normal EF, mild MR. Cardiac cath was also done that showed essentially normal coronaries and normal LV function.  She continues Eliquis for history of PE. Holter for palpitations showed no significant arrhythmia. Cardiac workup in January 2019 for recurrent CP showed no ischemia.  She also has cirrhosis and follows with Dr. Mcclellan and Dr. Cardoza.  She has been placed on liver transplant list. Office note from Dr. Gooden recommends NOAC changed to coumadin.     Today she comes to the office for a follow up visit.  She denies cardiac symptoms or concerns.  No worsening shortness of breath, palpitations, or chest pain noted.  She does request changing anticoagulation therapy prior to her visit with the transplant team at  on the 28th of this month.    Cardiac History:    Past Surgical History:   Procedure Laterality Date   • CARDIAC CATHETERIZATION  08/24/2016    Normal Coronaries   • CARDIAC CATHETERIZATION  2004    at [facility] after tPa. No stents.   • CARDIAC CATHETERIZATION  08/24/2016    EF 60%, normal coronaries   • CARDIOVASCULAR STRESS TEST  2014    ([facility] CVA)   • CARDIOVASCULAR STRESS TEST  01/23/2019    @ [facility] Cardiolite- Negative. (Had nausea,vomitting and SOA.)   • CONVERTED (HISTORICAL)  HOLTER  07/02/2018    Baseline NSR:  Avg HR 92, no arrythmia, max 133, low 63   • ECHO - CONVERTED  08/24/2016    @[facility]. EF 60%   • ECHO - CONVERTED  2014    ([facility] CVA)    • ECHO - CONVERTED  08/24/2016    EF 60%, mild MR   • ECHO - CONVERTED  01/16/2019    @ [facility]. EF 65%. Mild MR. RVSP- 44 mmHg.       Current Outpatient Medications   Medication Sig Dispense Refill   • albuterol (PROVENTIL HFA;VENTOLIN HFA) 108 (90 Base) MCG/ACT inhaler Inhale 2 puffs Every 4 (Four) Hours As Needed for Wheezing.     • allopurinol (ZYLOPRIM) 300 MG tablet Take 300 mg by mouth Daily.     • ARIPiprazole (ABILIFY) 2 MG tablet Take 2 mg by mouth Daily.     • Continuous Blood Gluc Sensor (Dexcom G6 Sensor) Every 10 (Ten) Days.     • Dapagliflozin Propanediol (Farxiga) 10 MG tablet Take  by mouth Daily.     • DULoxetine (CYMBALTA) 60 MG capsule Take 60 mg by mouth Daily.     • Eliquis 5 MG tablet tablet TAKE ONE TABLET BY MOUTH EVERY TWELVE HOURS 60 tablet 3   • esomeprazole (nexIUM) 40 MG capsule Take 40 mg by mouth Every Morning Before Breakfast.     • furosemide (LASIX) 40 MG tablet Take 40 mg by mouth Daily.     • hydrOXYzine pamoate (VISTARIL) 25 MG capsule Take 25 mg by mouth 3 (Three) Times a Day As Needed for Itching.     • Insulin Glargine (BASAGLAR KWIKPEN) 100 UNIT/ML injection pen Inject  under the skin.     • ipratropium-albuterol (COMBIVENT RESPIMAT)  MCG/ACT inhaler Inhale 1 puff 4 (Four) Times a Day.     • isosorbide mononitrate (IMDUR) 30 MG 24 hr tablet Take 30 mg by mouth Daily.     • lactulose (CHRONULAC) 10 GM/15ML solution Take 20 g by mouth Daily.     • levocetirizine (XYZAL) 5 MG tablet Take 5 mg by mouth Every Evening.     • levothyroxine (SYNTHROID, LEVOTHROID) 175 MCG tablet Take 175 mcg by mouth Daily.     • linaclotide (LINZESS) 72 MCG capsule capsule Take 72 mcg by mouth Every Morning Before Breakfast.     • magnesium oxide (MAGOX) 400 (241.3 Mg) MG tablet tablet Take 400 mg by mouth 3 (Three) Times a Day.      • meclizine (ANTIVERT) 25 MG tablet Take 25 mg by mouth Every 6 (Six) Hours As Needed for dizziness.     • methocarbamol (ROBAXIN) 500 MG tablet Take 500 mg by mouth 2 (Two) Times a Day As Needed for Muscle Spasms.     • metoprolol succinate XL (TOPROL-XL) 100 MG 24 hr tablet TAKE 1/2 TABLET BY MOUTH TWICE DAILY 30 tablet 0   • montelukast (SINGULAIR) 10 MG tablet Take 10 mg by mouth Every Night.     • nitroglycerin (NITROSTAT) 0.4 MG SL tablet Place 1 tablet under the tongue every 5 minutes as needed for chest pain. max of 3 doses in 15 minutes. then go to er 25 tablet 1   • Omega-3 Fatty Acids (OMEGA 3 PO) Take 2 g by mouth 2 (two) times a day.     • potassium chloride (K-DUR,KLOR-CON) 20 MEQ CR tablet Take 20 mEq by mouth Daily.     • pravastatin (PRAVACHOL) 40 MG tablet Take 40 mg by mouth Daily.     • primidone (MYSOLINE) 50 MG tablet Take 50 mg by mouth Daily.     • SITagliptin (JANUVIA) 100 MG tablet Take 100 mg by mouth Daily.     • spironolactone (ALDACTONE) 100 MG tablet Take 50 mg by mouth Daily.     • SUMAtriptan (IMITREX) 50 MG tablet Take 50 mg by mouth Every 2 (Two) Hours As Needed for Migraine. Take one tablet at onset of headache. May repeat dose one time in 2 hours if headache not relieved.     • temazepam (RESTORIL) 30 MG capsule Take 30 mg by mouth Daily.       No current facility-administered medications for this visit.       Oxycodone, Penicillins, and Sulfa antibiotics    Past Medical History:   Diagnosis Date   • Cirrhosis of liver (HCC)    • H/O cervical spine surgery    • History of cholecystectomy    • History of endometrial ablation    • Hypercholesterolemia    • Hyperlipidemia    • Hypertension    • Metabolic syndrome    • Pulmonary emboli (HCC)        Social History     Socioeconomic History   • Marital status:    Tobacco Use   • Smoking status: Former Smoker     Quit date:      Years since quittin.1   • Smokeless tobacco: Never Used   Vaping Use   • Vaping Use:  "Never used   Substance and Sexual Activity   • Alcohol use: No   • Drug use: No       Family History   Problem Relation Age of Onset   • Hypertension Mother    • Cancer Father    • Heart attack Maternal Uncle    • Hyperlipidemia Son        Review of Systems   Constitutional: Negative for decreased appetite, diaphoresis and malaise/fatigue.   HENT: Negative for nosebleeds.    Eyes: Negative for blurred vision.   Cardiovascular: Negative for chest pain, claudication, cyanosis, dyspnea on exertion, irregular heartbeat, leg swelling, near-syncope, orthopnea, palpitations, paroxysmal nocturnal dyspnea and syncope.   Respiratory: Positive for shortness of breath (with exertion, no worse). Negative for snoring.    Endocrine: Negative for cold intolerance and heat intolerance.   Hematologic/Lymphatic: Negative for bleeding problem. Does not bruise/bleed easily.   Skin: Negative for rash.   Musculoskeletal: Negative for myalgias.   Gastrointestinal: Negative for heartburn, melena and nausea.   Genitourinary: Negative for dysuria and hematuria.   Neurological: Negative for dizziness and light-headedness.   Psychiatric/Behavioral: The patient does not have insomnia and is not nervous/anxious.         BP Readings from Last 5 Encounters:   02/14/22 122/70   06/07/21 100/60   11/27/19 122/82   12/12/18 140/90   06/06/18 120/80       Wt Readings from Last 5 Encounters:   02/14/22 92 kg (202 lb 12.8 oz)   06/07/21 93 kg (205 lb)   11/27/19 89.4 kg (197 lb)   12/12/18 82.1 kg (181 lb)   06/06/18 89.4 kg (197 lb)       Objective      Labs 11/23/2021: Glucose 190, BUN 19, creatinine 1.2, sodium 135, potassium 4.8, chloride 97, CO2 27, total calcium 9.7, total protein 7.1, albumin 4.6, AST 48, ALT 36, , bili Jossue 1.5, GFR 46    /70 (BP Location: Right arm)   Pulse 64   Temp 97.3 °F (36.3 °C)   Ht 165.1 cm (65\")   Wt 92 kg (202 lb 12.8 oz)   BMI 33.75 kg/m²     Vitals and nursing note reviewed.   Eyes:      Pupils: " Pupils are equal, round, and reactive to light.   HENT:      Head: Normocephalic.   Neck:      Vascular: No carotid bruit or JVD.   Pulmonary:      Breath sounds: Normal breath sounds.   Cardiovascular:      Normal rate. Regular rhythm.   Edema:     Peripheral edema absent.   Abdominal:      General: Bowel sounds are normal.      Palpations: Abdomen is soft.   Musculoskeletal: Normal range of motion.      Cervical back: Normal range of motion. Skin:     General: Skin is warm.   Neurological:      Mental Status: Alert and oriented to person, place, and time.          Procedures: none today          Assessment/Plan   Diagnoses and all orders for this visit:    1. Primary hypertension (Primary)    2. Pure hypercholesterolemia    3. Palpitations    4. Personal history of PE (pulmonary embolism)    5. Other cirrhosis of liver (HCC)    6. Obstructive sleep apnea syndrome    Other orders  -     nitroglycerin (NITROSTAT) 0.4 MG SL tablet; Place 1 tablet under the tongue every 5 minutes as needed for chest pain. max of 3 doses in 15 minutes. then go to er  Dispense: 25 tablet; Refill: 1       Hypertension-remains controlled.  Continue metoprolol, spironolactone, and Lasix.    Hypercholesterolemia-managed with pravastatin.  She will follow with you and hematologist, gastroenterologist as well as liver transplant team for management of labs.    Palpitations-heart rate and rhythm normal today.  Palpitations denied.  Continue same dose beta-blocker.  Avoid caffeine.  Adequate rest and stress management encouraged.    Diabetes-according to patient currently well controlled.    History of PE/long-term anticoagulation therapy.  As recommended by liver transplant team agreed to change NOAC to Coumadin therapy.  Given she lives in [facility] patient agrees management would be better followed by PCP office.  Patient will contact your office in regards.  She understands to continue NOAC until further recommendations from your  office.    Riley cirrhosis- followed by Dr. Peters at .     Patient's Body mass index is 33.75 kg/m². indicating that she is obese (BMI >30). Obesity-related health conditions include the following: hypertension and dyslipidemias. Obesity is unchanged. BMI is is above average; BMI management plan is completed. We discussed portion control and increasing exercise..     Currently patient appears stable from a cardiac standpoint. We will plan repeat cardiac work-up next visit due to her cardiac risk and length of time from prior testing.  A 6-month follow-up visit scheduled.  Please call sooner for cardiac concerns.             Electronically signed by AYLIN Olivera,  February 14, 2022 16:50 EST

## 2022-02-16 NOTE — TELEPHONE ENCOUNTER
Called PCP's office, spoke with Anjelica. She said that they would stop the Eliquis, start the Coumadin and monitor. They were going to notify the patient with instructions.

## 2023-06-15 ENCOUNTER — TELEPHONE (OUTPATIENT)
Dept: CARDIOLOGY | Facility: CLINIC | Age: 58
End: 2023-06-15
Payer: COMMERCIAL

## 2023-06-15 NOTE — TELEPHONE ENCOUNTER
Received fax from Dr. Mcclellan for cardiac clearance for patient to have an EGD and a colonoscopy. Patient is on warfarin and they are requesting to hold. According to our records, I do not see where patient has had any stenting. Patient has a history of PE.         Fax [phone]